# Patient Record
Sex: MALE
[De-identification: names, ages, dates, MRNs, and addresses within clinical notes are randomized per-mention and may not be internally consistent; named-entity substitution may affect disease eponyms.]

---

## 2020-05-08 NOTE — EMERGENCY DEPARTMENT REPORT
ED Psych HPI





- General


Chief Complaint: Psych


Stated Complaint: SI


Time Seen by Provider: 05/08/20 23:16


Source: patient, EMS ( EMS documentation not available at time of chart 

dictation ), RN notes reviewed


Mode of arrival: Ambulatory


Limitations: No Limitations





- History of Present Illness


Initial Comments: 





Patient is a 44-year-old gentleman with a history of psychiatric disease, not 

known to myself previously, presenting to the ER with a complaint of painless 

suicidality.  He indicates he will likely jump into traffic.  He denies physical

pain at this time.  He denies attempting to overdose.  He endorses compliance 

with his psychiatric medications.  He states he does not take lithium or 

valproic acid.





He denies headache, neck pain, chest pain, abdominal pain, shortness of breath, 

and urinary symptoms.  He indicates he cannot think of any exacerbating or 

relieving factors for his current symptoms, nor can he think of any inciting 

factors.  He is staying at home, self isolating in quarantining, and he 

indicates he does not feel like he has been exposed to the coronavirus.  The 

more, he indicates that he does not want to get it because "I do not want to get

sick."


MD Complaint: suicidal ideation, feels depressed


-: Gradual


Associated Psychiatric Symptoms: suicidal ideation


History of same: Yes


Quality: other


Improves With: none


Worsens With: none


Associated Symptoms: denies other symptoms


If Self Harm: admits thoughts of, has plan





- Related Data


                                    Allergies











Allergy/AdvReac Type Severity Reaction Status Date / Time


 


ziprasidone [From Geodon] Allergy  Anaphylaxis Verified 05/01/20 23:41














ED Review of Systems


ROS: 


Stated complaint: SI


Other details as noted in HPI





Comment: All other systems reviewed and negative


Psychiatric: suicidal thoughts.  denies: homicidal thoughts





ED Past Medical Hx





- Past Medical History


Hx Psychiatric Treatment: Yes (Bipolar schizophrenia; Depression)


Additional medical history: Heart murmur





- Surgical History


Past Surgical History?: Yes


Additional Surgical History: Right ankle, pilonidal cysts





- Social History


Smoking Status: Current Some Day Smoker


Substance Use Type: None





ED Physical Exam





- General


Limitations: No Limitations


General appearance: alert, obese





- Head


Head exam: Present: atraumatic, normocephalic





- Eye


Eye exam: Present: normal appearance, EOMI.  Absent: nystagmus





- ENT


ENT exam: Present: normal exam, normal orophraynx, mucous membranes moist, 

normal external ear exam





- Neck


Neck exam: Present: normal inspection, full ROM.  Absent: tenderness, m

eningismus





- Respiratory


Respiratory exam: Present: normal lung sounds bilaterally.  Absent: respiratory 

distress





- Cardiovascular


Cardiovascular Exam: Present: regular rate, normal rhythm, normal heart sounds. 

Absent: bradycardia, tachycardia, irregular rhythm, systolic murmur, diastolic 

murmur, rubs, gallop





- GI/Abdominal


GI/Abdominal exam: Present: soft, normal bowel sounds.  Absent: distended, 

tenderness, guarding, rebound, rigid, pulsatile mass





- Rectal


Rectal exam: Present: deferred





- Extremities Exam


Extremities exam: Present: normal inspection, full ROM, other (2+ pulses noted 

in the bilateral upper and lower extremities.  There is no palpable cord.   

negative Homans sign.  Muscular compartments are soft.  The pelvis is stable.). 

Absent: pedal edema, calf tenderness





- Back Exam


Back exam: Present: normal inspection, full ROM.  Absent: tenderness, CVA 

tenderness (R), CVA tenderness (L), paraspinal tenderness, vertebral tenderness





- Neurological Exam


Neurological exam: Present: alert, oriented X3, normal gait, other (No facial 

droop.  Tongue midline.  Extraocular movements intact bilaterally.  Facial s

ensation intact to light touch in V1, V2, V3 distribution bilaterally.  5 and a 

5 strength in 4 extremities.  Sensation intact to light touch in 4 

extremities.).  Absent: motor sensory deficit





- Psychiatric


Psychiatric exam: Present: flat affect, suicidal ideation





- Skin


Skin exam: Present: warm, dry, intact, normal color.  Absent: rash





ED Course


                                   Vital Signs











  05/08/20 05/08/20 05/09/20





  22:44 22:47 02:08


 


Temperature 98.5 F  98.7 F


 


Pulse Rate 114 H  96 H


 


Respiratory 16 20 18





Rate   


 


Blood Pressure 147/85  


 


Blood Pressure   140/67





[Right]   


 


O2 Sat by Pulse 94 100 95





Oximetry   














  05/09/20 05/09/20





  10:23 13:53


 


Temperature 99.1 F 98.7 F


 


Pulse Rate 92 H 101 H


 


Respiratory 18 18





Rate  


 


Blood Pressure 130/78 136/79


 


Blood Pressure  





[Right]  


 


O2 Sat by Pulse 96 97





Oximetry  














ED Medical Decision Making





- Lab Data


Result diagrams: 


                                 05/08/20 22:55





                                 05/08/20 22:55








                                   Lab Results











  05/08/20 05/08/20 05/08/20 Range/Units





  22:55 22:55 22:55 


 


WBC     (4.5-11.0)  K/mm3


 


RBC     (3.65-5.03)  M/mm3


 


Hgb     (11.8-15.2)  gm/dl


 


Hct     (35.5-45.6)  %


 


MCV     (84-94)  fl


 


MCH     (28-32)  pg


 


MCHC     (32-34)  %


 


RDW     (13.2-15.2)  %


 


Plt Count     (140-440)  K/mm3


 


Lymph % (Auto)     (13.4-35.0)  %


 


Mono % (Auto)     (0.0-7.3)  %


 


Eos % (Auto)     (0.0-4.3)  %


 


Baso % (Auto)     (0.0-1.8)  %


 


Lymph #     (1.2-5.4)  K/mm3


 


Mono #     (0.0-0.8)  K/mm3


 


Eos #     (0.0-0.4)  K/mm3


 


Baso #     (0.0-0.1)  K/mm3


 


Seg Neutrophils %     (40.0-70.0)  %


 


Seg Neutrophils #     (1.8-7.7)  K/mm3


 


Sodium    137  (137-145)  mmol/L


 


Potassium    4.1  (3.6-5.0)  mmol/L


 


Chloride    100.1  ()  mmol/L


 


Carbon Dioxide    22  (22-30)  mmol/L


 


Anion Gap    19  mmol/L


 


BUN    11  (9-20)  mg/dL


 


Creatinine    0.7 L  (0.8-1.5)  mg/dL


 


Estimated GFR    > 60  ml/min


 


BUN/Creatinine Ratio    16  %


 


Glucose    104 H  ()  mg/dL


 


Calcium    9.4  (8.4-10.2)  mg/dL


 


Salicylates  < 0.3 L    (2.8-20.0)  mg/dL


 


Acetaminophen   < 5.0 L   (10.0-30.0)  ug/mL


 


Plasma/Serum Alcohol     (0-0.07)  %














  05/08/20 05/08/20 Range/Units





  22:55 22:55 


 


WBC   8.1  (4.5-11.0)  K/mm3


 


RBC   5.92 H  (3.65-5.03)  M/mm3


 


Hgb   18.1 H  (11.8-15.2)  gm/dl


 


Hct   51.6 H  (35.5-45.6)  %


 


MCV   87  (84-94)  fl


 


MCH   31  (28-32)  pg


 


MCHC   35 H  (32-34)  %


 


RDW   14.1  (13.2-15.2)  %


 


Plt Count   211  (140-440)  K/mm3


 


Lymph % (Auto)   35.2 H  (13.4-35.0)  %


 


Mono % (Auto)   8.2 H  (0.0-7.3)  %


 


Eos % (Auto)   3.1  (0.0-4.3)  %


 


Baso % (Auto)   2.5 H  (0.0-1.8)  %


 


Lymph #   2.9  (1.2-5.4)  K/mm3


 


Mono #   0.7  (0.0-0.8)  K/mm3


 


Eos #   0.3  (0.0-0.4)  K/mm3


 


Baso #   0.2 H  (0.0-0.1)  K/mm3


 


Seg Neutrophils %   51.0  (40.0-70.0)  %


 


Seg Neutrophils #   4.2  (1.8-7.7)  K/mm3


 


Sodium    (137-145)  mmol/L


 


Potassium    (3.6-5.0)  mmol/L


 


Chloride    ()  mmol/L


 


Carbon Dioxide    (22-30)  mmol/L


 


Anion Gap    mmol/L


 


BUN    (9-20)  mg/dL


 


Creatinine    (0.8-1.5)  mg/dL


 


Estimated GFR    ml/min


 


BUN/Creatinine Ratio    %


 


Glucose    ()  mg/dL


 


Calcium    (8.4-10.2)  mg/dL


 


Salicylates    (2.8-20.0)  mg/dL


 


Acetaminophen    (10.0-30.0)  ug/mL


 


Plasma/Serum Alcohol  < 0.01   (0-0.07)  %











                                   Vital Signs











  05/08/20





  22:44


 


Temperature 98.5 F


 


Pulse Rate 114 H


 


Respiratory 16





Rate 


 


Blood Pressure 147/85


 


O2 Sat by Pulse 94





Oximetry 














- Medical Decision Making





Differential diagnosis, including but not limited to: Suicidality, dysthymia, 

depression, medical clearance for psychiatric placement





Assessment and plan: 44-year-old gentleman, who is afebrile, with reassuring 

vital signs, clinically sober, walking with a steady gait, without complaint of 

physical pain, GCS of 15, pleasant and cooperative, with a blunted and flattened

affect, complaining of suicidality with plan to jump into traffic.  He is placed

on hold.  Screening laboratory studies are reviewed and appreciated.  They are 

fairly unremarkable.  Psychiatric consultation is requested.  I will also 

request that nursing team reconcile the patient's home medications.  At this 

point in time, the patient does not appear to have an immediate medical 

contraindication to psychiatric admission, evaluation, consultation and 

placement.


Critical care attestation.: 


If time is entered above; I have spent that time in minutes in the direct care 

of this critically ill patient, excluding procedure time.








ED Disposition


Clinical Impression: 


 Medical clearance for psychiatric admission





Disposition: DC-01 TO HOME OR SELFCARE


Is pt being admited?: No


Does the pt Need Aspirin: No


Condition: Good


Referrals: 


PRIMARY CARE,MD [Primary Care Provider] - 3-5 Days

## 2020-05-18 NOTE — EMERGENCY DEPARTMENT REPORT
ED Abdominal Pain HPI





- General


Chief Complaint: Abdominal Pain


Stated Complaint: N/V


Time Seen by Provider: 05/18/20 09:03


Source: patient, police


Mode of arrival: Ambulatory


Limitations: No Limitations





- History of Present Illness


Initial Comments: 


This is a 44-year old man who has had nausea vomiting and diarrhea for about 4 

days.  He states he has had some epigastric discomfort but only on vomiting.  He

believes he has had a fever but no recent chills.  He denies a history of any 

previous presentation for gastrointestinal illness.  He said no prior surgeries.

 He denies any other symptoms.





MD Complaint: abdominal pain


-: Gradual, days(s)


Location: epigastric


Radiation: none


Migration to: no migration


Severity: mild (Pain was mild to moderate and associated with nausea and 

vomiting), moderate


Quality: cramping


Consistency: now resolved


Improves With: nothing


Worsens With: nothing


Associated Symptoms: nausea, vomiting, diarrhea, fever





- Related Data


                                  Previous Rx's











 Medication  Instructions  Recorded  Last Taken  Type


 


Ondansetron [Zofran Odt] 4 mg PO Q6H PRN #10 tab.rapdis 05/18/20 Unknown Rx


 


metroNIDAZOLE [Flagyl] 500 mg PO Q8HR #20 tablet 05/18/20 Unknown Rx











                                    Allergies











Allergy/AdvReac Type Severity Reaction Status Date / Time


 


ziprasidone [From Geodon] Allergy  Anaphylaxis Verified 05/01/20 23:41














ED Review of Systems


ROS: 


Stated complaint: N/V


Other details as noted in HPI





Constitutional: denies: chills, fever


Eyes: denies: eye pain, eye discharge, vision change


ENT: denies: ear pain, throat pain


Respiratory: denies: cough, shortness of breath, wheezing


Cardiovascular: denies: chest pain, palpitations


Endocrine: no symptoms reported


Gastrointestinal: abdominal pain, nausea, vomiting, diarrhea


Genitourinary: denies: urgency, dysuria


Musculoskeletal: denies: back pain, joint swelling, arthralgia


Skin: denies: rash, lesions


Neurological: denies: headache, weakness, paresthesias


Psychiatric: denies: anxiety, depression


Hematological/Lymphatic: denies: easy bleeding, easy bruising





ED Past Medical Hx





- Past Medical History


Previous Medical History?: Yes


Hx Psychiatric Treatment: Yes (Bipolar schizophrenia; Depression)


Additional medical history: Heart murmur





- Surgical History


Past Surgical History?: Yes


Additional Surgical History: Right ankle, pilonidal cysts





- Social History


Smoking Status: Current Every Day Smoker





- Medications


Home Medications: 


                                Home Medications











 Medication  Instructions  Recorded  Confirmed  Last Taken  Type


 


Ondansetron [Zofran Odt] 4 mg PO Q6H PRN #10 tab.rapdis 05/18/20  Unknown Rx


 


metroNIDAZOLE [Flagyl] 500 mg PO Q8HR #20 tablet 05/18/20  Unknown Rx














ED Physical Exam





- General


Limitations: No Limitations


General appearance: alert, in no apparent distress





- Head


Head exam: Present: atraumatic, normocephalic





- Eye


Eye exam: Present: normal appearance.  Absent: scleral icterus





- ENT


ENT exam: Present: mucous membranes moist





- Neck


Neck exam: Present: normal inspection.  Absent: tenderness, meningismus





- Respiratory


Respiratory exam: Present: normal lung sounds bilaterally.  Absent: respiratory 

distress





- Cardiovascular


Cardiovascular Exam: Present: normal rhythm, tachycardia.  Absent: systolic 

murmur, diastolic murmur, rubs, gallop





- GI/Abdominal


GI/Abdominal exam: Present: soft, normal bowel sounds.  Absent: distended, te

nderness, guarding, rebound, rigid





- Rectal


Rectal exam: Present: deferred





- Extremities Exam


Extremities exam: Present: normal inspection





- Back Exam


Back exam: Present: normal inspection





- Neurological Exam


Neurological exam: Present: alert, oriented X3, CN II-XII intact.  Absent: motor

sensory deficit





- Psychiatric


Psychiatric exam: Present: normal affect, normal mood





- Skin


Skin exam: Present: warm, dry, intact, normal color.  Absent: rash





ED Course


                                   Vital Signs











  05/18/20 05/18/20 05/18/20





  06:08 11:54 12:01


 


Temperature 100.4 F H  


 


Pulse Rate 125 H  112 H


 


Respiratory 18 22 24





Rate   


 


Blood Pressure 118/80  128/74


 


O2 Sat by Pulse 95  96





Oximetry   














- Reevaluation(s)


Reevaluation #1: 


Patient received 1 dose of Zosyn empirically.  A CT showed no evidence of intra-

abdominal inflammation.  2 lactic acid levels were normal.  I am going to 

empirically place him on Flagyl give him Zosyn and appropriate return 

instructions.  He is appropriate for outpatient disposition.


05/18/20 13:21








ED Medical Decision Making





- Lab Data


Result diagrams: 


                                 05/18/20 06:42





                                 05/18/20 06:42








                         Laboratory Results - last 24 hr











  05/18/20 05/18/20 05/18/20





  06:42 06:42 07:03


 


WBC  15.3 H  


 


RBC  6.27 H  


 


Hgb  19.0 H  


 


Hct  54.7 H  


 


MCV  87  


 


MCH  30  


 


MCHC  35 H  


 


RDW  14.1  


 


Plt Count  208  


 


Add Manual Diff  Complete  


 


Total Counted  100  


 


Seg Neutrophils %  Np  


 


Seg Neuts % (Manual)  86.0 H  


 


Band Neutrophils %  1.0  


 


Lymphocytes % (Manual)  5.0 L  


 


Reactive Lymphs % (Man)  0  


 


Monocytes % (Manual)  6.0  


 


Eosinophils % (Manual)  2.0  


 


Basophils % (Manual)  0  


 


Metamyelocytes %  0  


 


Myelocytes %  0  


 


Promyelocytes %  0  


 


Blast Cells %  0  


 


Nucleated RBC %  Not Reportable  


 


Seg Neutrophils # Man  13.2 H  


 


Band Neutrophils #  0.2  


 


Lymphocytes # (Manual)  0.8 L  


 


Abs React Lymphs (Man)  0.0  


 


Monocytes # (Manual)  0.9 H  


 


Eosinophils # (Manual)  0.3  


 


Basophils # (Manual)  0.0  


 


Metamyelocytes #  0.0  


 


Myelocytes #  0.0  


 


Promyelocytes #  0.0  


 


Blast Cells #  0.0  


 


WBC Morphology  Not Reportable  


 


Hypersegmented Neuts  Not Reportable  


 


Hyposegmented Neuts  Not Reportable  


 


Hypogranular Neuts  Not Reportable  


 


Smudge Cells  Not Reportable  


 


Toxic Granulation  Not Reportable  


 


Toxic Vacuolation  Not Reportable  


 


Dohle Bodies  Not Reportable  


 


Pelger-Huet Anomaly  Not Reportable  


 


Virgen Rods  Not Reportable  


 


Platelet Estimate  Consistent w auto  


 


Clumped Platelets  Not Reportable  


 


Plt Clumps, EDTA  Not Reportable  


 


Large Platelets  Few  


 


Giant Platelets  Not Reportable  


 


Platelet Satelliting  Not Reportable  


 


Plt Morphology Comment  Not Reportable  


 


RBC Morphology  Not Reportable  


 


Dimorphic RBCs  Not Reportable  


 


Polychromasia  Not Reportable  


 


Hypochromasia  Not Reportable  


 


Poikilocytosis  Not Reportable  


 


Anisocytosis  1+  


 


Microcytosis  Not Reportable  


 


Macrocytosis  Not Reportable  


 


Spherocytes  Not Reportable  


 


Pappenheimer Bodies  Not Reportable  


 


Sickle Cells  Not Reportable  


 


Target Cells  Not Reportable  


 


Tear Drop Cells  Not Reportable  


 


Ovalocytes  Not Reportable  


 


Stomatocytes  1+  


 


Helmet Cells  Not Reportable  


 


[place] Bodies  Not Reportable  


 


Cabot Rings  Not Reportable  


 


[place] Cells  Not Reportable  


 


Bite Cells  Not Reportable  


 


Crenated Cell  Not Reportable  


 


Elliptocytes  Not Reportable  


 


Acanthocytes (Spur)  Not Reportable  


 


Rouleaux  Not Reportable  


 


Hemoglobin C Crystals  Not Reportable  


 


Schistocytes  Not Reportable  


 


Malaria parasites  Not Reportable  


 


[place] Bodies  Not Reportable  


 


Hem Pathologist Commnt  No  


 


PT    12.4


 


INR    0.91


 


VBG pH   


 


Sodium   138 


 


Potassium   4.4 


 


Chloride   99.3 


 


Carbon Dioxide   22 


 


Anion Gap   21 


 


BUN   20 


 


Creatinine   0.8 


 


Estimated GFR   > 60 


 


BUN/Creatinine Ratio   25 


 


Glucose   121 H 


 


Lactic Acid   


 


Calcium   9.0 


 


Total Bilirubin   0.60 


 


AST   25 


 


ALT   37 


 


Alkaline Phosphatase   72 


 


Total Protein   6.9 


 


Albumin   4.8 


 


Albumin/Globulin Ratio   2.3 


 


Lipase   18 


 


Urine Color   


 


Urine Turbidity   


 


Urine pH   


 


Ur Specific Gravity   


 


Urine Protein   


 


Urine Glucose (UA)   


 


Urine Ketones   


 


Urine Blood   


 


Urine Nitrite   


 


Urine Bilirubin   


 


Urine Urobilinogen   


 


Ur Leukocyte Esterase   


 


Urine WBC (Auto)   


 


Urine RBC (Auto)   


 


Urine Mucus   














  05/18/20 05/18/20 05/18/20





  07:05 07:07 07:07


 


WBC   


 


RBC   


 


Hgb   


 


Hct   


 


MCV   


 


MCH   


 


MCHC   


 


RDW   


 


Plt Count   


 


Add Manual Diff   


 


Total Counted   


 


Seg Neutrophils %   


 


Seg Neuts % (Manual)   


 


Band Neutrophils %   


 


Lymphocytes % (Manual)   


 


Reactive Lymphs % (Man)   


 


Monocytes % (Manual)   


 


Eosinophils % (Manual)   


 


Basophils % (Manual)   


 


Metamyelocytes %   


 


Myelocytes %   


 


Promyelocytes %   


 


Blast Cells %   


 


Nucleated RBC %   


 


Seg Neutrophils # Man   


 


Band Neutrophils #   


 


Lymphocytes # (Manual)   


 


Abs React Lymphs (Man)   


 


Monocytes # (Manual)   


 


Eosinophils # (Manual)   


 


Basophils # (Manual)   


 


Metamyelocytes #   


 


Myelocytes #   


 


Promyelocytes #   


 


Blast Cells #   


 


WBC Morphology   


 


Hypersegmented Neuts   


 


Hyposegmented Neuts   


 


Hypogranular Neuts   


 


Smudge Cells   


 


Toxic Granulation   


 


Toxic Vacuolation   


 


Dohle Bodies   


 


Pelger-Huet Anomaly   


 


Virgen Rods   


 


Platelet Estimate   


 


Clumped Platelets   


 


Plt Clumps, EDTA   


 


Large Platelets   


 


Giant Platelets   


 


Platelet Satelliting   


 


Plt Morphology Comment   


 


RBC Morphology   


 


Dimorphic RBCs   


 


Polychromasia   


 


Hypochromasia   


 


Poikilocytosis   


 


Anisocytosis   


 


Microcytosis   


 


Macrocytosis   


 


Spherocytes   


 


Pappenheimer Bodies   


 


Sickle Cells   


 


Target Cells   


 


Tear Drop Cells   


 


Ovalocytes   


 


Stomatocytes   


 


Helmet Cells   


 


[place] Bodies   


 


Cabot Rings   


 


Bridget Cells   


 


Bite Cells   


 


Crenated Cell   


 


Elliptocytes   


 


Acanthocytes (Spur)   


 


Rouleaux   


 


Hemoglobin C Crystals   


 


Schistocytes   


 


Malaria parasites   


 


[place] Bodies   


 


Hem Pathologist Commnt   


 


PT   


 


INR   


 


VBG pH    7.429 H


 


Sodium   


 


Potassium   


 


Chloride   


 


Carbon Dioxide   


 


Anion Gap   


 


BUN   


 


Creatinine   


 


Estimated GFR   


 


BUN/Creatinine Ratio   


 


Glucose   


 


Lactic Acid   2.10 H* 


 


Calcium   


 


Total Bilirubin   


 


AST   


 


ALT   


 


Alkaline Phosphatase   


 


Total Protein   


 


Albumin   


 


Albumin/Globulin Ratio   


 


Lipase   


 


Urine Color  Yellow  


 


Urine Turbidity  Clear  


 


Urine pH  5.0  


 


Ur Specific Gravity  1.028  


 


Urine Protein  <15 mg/dl  


 


Urine Glucose (UA)  Neg  


 


Urine Ketones  Neg  


 


Urine Blood  Neg  


 


Urine Nitrite  Neg  


 


Urine Bilirubin  Neg  


 


Urine Urobilinogen  < 2.0  


 


Ur Leukocyte Esterase  Neg  


 


Urine WBC (Auto)  1.0  


 


Urine RBC (Auto)  3.0  


 


Urine Mucus  2+  














  05/18/20 05/18/20





  09:13 10:33


 


WBC  


 


RBC  


 


Hgb  


 


Hct  


 


MCV  


 


MCH  


 


MCHC  


 


RDW  


 


Plt Count  


 


Add Manual Diff  


 


Total Counted  


 


Seg Neutrophils %  


 


Seg Neuts % (Manual)  


 


Band Neutrophils %  


 


Lymphocytes % (Manual)  


 


Reactive Lymphs % (Man)  


 


Monocytes % (Manual)  


 


Eosinophils % (Manual)  


 


Basophils % (Manual)  


 


Metamyelocytes %  


 


Myelocytes %  


 


Promyelocytes %  


 


Blast Cells %  


 


Nucleated RBC %  


 


Seg Neutrophils # Man  


 


Band Neutrophils #  


 


Lymphocytes # (Manual)  


 


Abs React Lymphs (Man)  


 


Monocytes # (Manual)  


 


Eosinophils # (Manual)  


 


Basophils # (Manual)  


 


Metamyelocytes #  


 


Myelocytes #  


 


Promyelocytes #  


 


Blast Cells #  


 


WBC Morphology  


 


Hypersegmented Neuts  


 


Hyposegmented Neuts  


 


Hypogranular Neuts  


 


Smudge Cells  


 


Toxic Granulation  


 


Toxic Vacuolation  


 


Dohle Bodies  


 


Pelger-Huet Anomaly  


 


Virgen Rods  


 


Platelet Estimate  


 


Clumped Platelets  


 


Plt Clumps, EDTA  


 


Large Platelets  


 


Giant Platelets  


 


Platelet Satelliting  


 


Plt Morphology Comment  


 


RBC Morphology  


 


Dimorphic RBCs  


 


Polychromasia  


 


Hypochromasia  


 


Poikilocytosis  


 


Anisocytosis  


 


Microcytosis  


 


Macrocytosis  


 


Spherocytes  


 


Pappenheimer Bodies  


 


Sickle Cells  


 


Target Cells  


 


Tear Drop Cells  


 


Ovalocytes  


 


Stomatocytes  


 


Helmet Cells  


 


[place] Bodies  


 


Cabot Rings  


 


[place] Cells  


 


Bite Cells  


 


Crenated Cell  


 


Elliptocytes  


 


Acanthocytes (Spur)  


 


Rouleaux  


 


Hemoglobin C Crystals  


 


Schistocytes  


 


Malaria parasites  


 


[place] Bodies  


 


Hem Pathologist Commnt  


 


PT  


 


INR  


 


VBG pH  


 


Sodium  


 


Potassium  


 


Chloride  


 


Carbon Dioxide  


 


Anion Gap  


 


BUN  


 


Creatinine  


 


Estimated GFR  


 


BUN/Creatinine Ratio  


 


Glucose  


 


Lactic Acid  1.60  1.30


 


Calcium  


 


Total Bilirubin  


 


AST  


 


ALT  


 


Alkaline Phosphatase  


 


Total Protein  


 


Albumin  


 


Albumin/Globulin Ratio  


 


Lipase  


 


Urine Color  


 


Urine Turbidity  


 


Urine pH  


 


Ur Specific Gravity  


 


Urine Protein  


 


Urine Glucose (UA)  


 


Urine Ketones  


 


Urine Blood  


 


Urine Nitrite  


 


Urine Bilirubin  


 


Urine Urobilinogen  


 


Ur Leukocyte Esterase  


 


Urine WBC (Auto)  


 


Urine RBC (Auto)  


 


Urine Mucus  














- EKG Data


EKG shows normal: sinus rhythm, axis, intervals, QRS complexes, ST-T waves


Rate: tachycardia





- EKG Data


Interpretation: no acute changes, nonspecific ST-T wave chilo





- Radiology Data


Radiology results: report reviewed





IMPRESSION: 


1. Negative for obstruction or acute appearing inflammation. 


2. Increased density at the root of the small bowel mesentery with small 

associated nodes is likely


 reactive. 





Critical care attestation.: 


If time is entered above; I have spent that time in minutes in the direct care 

of this critically ill patient, excluding procedure time.








ED Disposition


Clinical Impression: 


 Gastroenteritis, Dehydration





Disposition: DC-01 TO HOME OR SELFCARE


Is pt being admited?: No


Does the pt Need Aspirin: No


Condition: Stable


Instructions:  Gastroenteritis (ED)


Additional Instructions: 


Tylenol as needed for fever.  Return any significant abdominal pain.  Return any

acute change or problem.  Follow-up with your primary care provider at [facility].  Rx as directed.  It is essential that you do not drink any 

alcohol while you are on the prescribed medications.


Prescriptions: 


metroNIDAZOLE [Flagyl] 500 mg PO Q8HR #20 tablet


Ondansetron [Zofran Odt] 4 mg PO Q6H PRN #10 tab.rapdis


 PRN Reason: Nausea


Referrals: 


PRIMARY CARE,MD [Primary Care Provider] - 3-5 Days


[facility] [Provider Group] - 3-5 Days


Time of Disposition: 13:22

## 2020-05-18 NOTE — CAT SCAN REPORT
CT abdomen pelvis w con



INDICATION:  abd pain fever x 3 days .



TECHNIQUE:

All CT scans at this location are performed using the following dose modulation technique: Automated 
exposure control.



CONTRAST:  Omnipaque 300, 100 cc IV injection.



COMPARISON: None available.



CT ABDOMEN: The parenchymal organs are unremarkable in appearance. Negative for abdominal mass, fluid
 or inflammation. The bowel is not dilated or thickened.



Mild increased density at the root of the small bowel mesentery with small associated nodes is likely
 reactive.



CT PELVIS: Negative for mass, fluid collection or inflammation. The appendix is normal.



IMPRESSION:

1. Negative for obstruction or acute appearing inflammation.

2. Increased density at the root of the small bowel mesentery with small associated nodes is likely r
eactive. 



Signer Name: Jah Parada MD 

Signed: 5/18/2020 10:57 AM

Workstation Name: [email or web address]

## 2020-05-18 NOTE — XRAY REPORT
CHEST 1 VIEW 



INDICATION / CLINICAL INFORMATION:

possible Sepsis. Cough



COMPARISON: 

5/1/2020



FINDINGS:



SUPPORT DEVICES: None.



HEART / MEDIASTINUM: No significant abnormality. 



LUNGS / PLEURA: Ill-defined density within the left lower lung has worsened from 5/1/2020



Signer Name: Wander Mann MD 

Signed: 5/18/2020 8:54 AM

Workstation Name: [email or web address]

## 2020-06-24 NOTE — EMERGENCY DEPARTMENT REPORT
ED Psych HPI





- General


Chief Complaint: Psych


Stated Complaint: PSYCH EVALUATION


Time Seen by Provider: 06/24/20 06:13


Source: patient


Mode of arrival: Ambulatory





- History of Present Illness


Initial Comments: 


This is a 44-year-old male with recent frequent visits to the emergency 

department.  He was seen early in May but did not require placement in an acute 

psychiatric facility.  Since then he has not followed up with a mental health 

provider.  He states he lives in [place].  States he took a bus here and 

finally walked to the hospital.  Is unclear why he would be utilizing this 

hospital.  He states he was previously on trazodone 150 mg at night and Vistaril

in the morning.  He was on another medication for hearing voices.  He states the

voices sometimes tell him to "help people".  He has no command loose Nations 

that would urge him to do violence to himself or others.  He is not currently 

hallucinating.  He has no intent to hurt himself.  When questioned further he 

mentions such things as his chronic problems such as sleep apnea that he needs 

continued care with.  He states he is basically here to be placed back on his 

medicine.





MD Complaint: other


-: year(s)


Associated Psychiatric Symptoms: auditory hallucinations


History of same: Yes


Quality: intermittent


Improves With: medication


Worsens With: none


Context: not taking psychiatric


Associated Symptoms: denies other symptoms


Treatments Prior to Arrival: none





- Related Data


                                  Previous Rx's











 Medication  Instructions  Recorded  Last Taken  Type


 


Ondansetron [Zofran Odt] 4 mg PO Q6H PRN #10 tab.rapdis 05/18/20 Unknown Rx


 


metroNIDAZOLE [Flagyl] 500 mg PO Q8HR #20 tablet 05/18/20 Unknown Rx


 


OLANzapine [ZyPREXA] 5 mg PO QDAY #30 tablet 06/24/20 Unknown Rx


 


traZODone [Desyrel] 100 mg PO QHS #14 tablet 06/24/20 Unknown Rx











                                    Allergies











Allergy/AdvReac Type Severity Reaction Status Date / Time


 


ziprasidone [From Geodon] Allergy  Anaphylaxis Verified 05/01/20 23:41














ED Review of Systems


ROS: 


Stated complaint: PSYCH EVALUATION


Other details as noted in HPI





Constitutional: denies: chills, fever


Eyes: denies: eye pain, eye discharge, vision change


ENT: denies: ear pain, throat pain


Respiratory: other (States he has sleep apnea).  denies: cough, shortness of 

breath, wheezing


Cardiovascular: denies: chest pain, palpitations


Endocrine: no symptoms reported


Gastrointestinal: denies: abdominal pain, nausea, diarrhea


Genitourinary: denies: urgency, dysuria


Musculoskeletal: denies: back pain, joint swelling, arthralgia


Skin: denies: rash, lesions


Neurological: as per HPI.  denies: headache, weakness, paresthesias


Psychiatric: denies: anxiety, depression


Hematological/Lymphatic: denies: easy bleeding, easy bruising





ED Past Medical Hx





- Past Medical History


Previous Medical History?: Yes


Hx GERD: Yes


Hx Psychiatric Treatment: Yes (Bipolar schizophrenia; Depression)


Additional medical history: Heart murmur





- Surgical History


Past Surgical History?: Yes


Additional Surgical History: Right ankle, pilonidal cysts





- Social History


Smoking Status: Current Every Day Smoker


Substance Use Type: None


Other Social History: 


States he is not homeless and lives in [place].








- Medications


Home Medications: 


                                Home Medications











 Medication  Instructions  Recorded  Confirmed  Last Taken  Type


 


Ondansetron [Zofran Odt] 4 mg PO Q6H PRN #10 tab.rapdis 05/18/20  Unknown Rx


 


metroNIDAZOLE [Flagyl] 500 mg PO Q8HR #20 tablet 05/18/20  Unknown Rx


 


OLANzapine [ZyPREXA] 5 mg PO QDAY #30 tablet 06/24/20  Unknown Rx


 


traZODone [Desyrel] 100 mg PO QHS #14 tablet 06/24/20  Unknown Rx














ED Physical Exam





- General


Limitations: No Limitations


General appearance: alert, in no apparent distress





- Head


Head exam: Present: atraumatic, normocephalic





- Eye


Eye exam: Present: normal appearance.  Absent: scleral icterus





- ENT


ENT exam: Present: mucous membranes moist





- Neck


Neck exam: Present: normal inspection.  Absent: meningismus





- Respiratory


Respiratory exam: Present: normal lung sounds bilaterally.  Absent: respiratory 

distress





- Cardiovascular


Cardiovascular Exam: Present: regular rate, normal rhythm.  Absent: systolic 

murmur, diastolic murmur, rubs, gallop





- GI/Abdominal


GI/Abdominal exam: Present: soft, normal bowel sounds.  Absent: distended, 

tenderness





- Rectal


Rectal exam: Present: deferred





- Extremities Exam


Extremities exam: Present: normal inspection





- Back Exam


Back exam: Present: normal inspection





- Neurological Exam


Neurological exam: Present: alert, oriented X3, CN II-XII intact.  Absent: motor

 sensory deficit





- Psychiatric


Psychiatric exam: Present: normal mood, flat affect, other (Sometimes mild loose

 associations)





- Skin


Skin exam: Present: warm, dry, intact, normal color.  Absent: rash





ED Course


                                   Vital Signs











  06/24/20





  03:33


 


Temperature 99.1 F


 


Pulse Rate 107 H


 


Respiratory 20





Rate 


 


Blood Pressure 135/89


 


O2 Sat by Pulse 95





Oximetry 














ED Medical Decision Making





- Lab Data


Result diagrams: 


                                 06/24/20 04:58





                                 06/24/20 04:58








                         Laboratory Results - last 24 hr











  06/24/20 06/24/20 06/24/20





  04:58 04:58 04:58


 


WBC   


 


RBC   


 


Hgb   


 


Hct   


 


MCV   


 


MCH   


 


MCHC   


 


RDW   


 


Plt Count   


 


Lymph % (Auto)   


 


Mono % (Auto)   


 


Eos % (Auto)   


 


Baso % (Auto)   


 


Lymph #   


 


Mono #   


 


Eos #   


 


Baso #   


 


Seg Neutrophils %   


 


Seg Neutrophils #   


 


Sodium    140


 


Potassium    4.0


 


Chloride    101.3


 


Carbon Dioxide    24


 


Anion Gap    19


 


BUN    16


 


Creatinine    0.8


 


Estimated GFR    > 60


 


BUN/Creatinine Ratio    20


 


Glucose    85


 


Calcium    9.5


 


Salicylates  < 0.3 L  


 


Acetaminophen   < 5.0 L 


 


Plasma/Serum Alcohol   














  06/24/20 06/24/20





  04:58 04:58


 


WBC   11.4 H


 


RBC   6.11 H


 


Hgb   18.6 H


 


Hct   54.3 H


 


MCV   89


 


MCH   31


 


MCHC   34


 


RDW   14.0


 


Plt Count   252


 


Lymph % (Auto)   27.4


 


Mono % (Auto)   7.9 H


 


Eos % (Auto)   2.3


 


Baso % (Auto)   2.1 H


 


Lymph #   3.1


 


Mono #   0.9 H


 


Eos #   0.3


 


Baso #   0.2 H


 


Seg Neutrophils %   60.3


 


Seg Neutrophils #   6.9


 


Sodium  


 


Potassium  


 


Chloride  


 


Carbon Dioxide  


 


Anion Gap  


 


BUN  


 


Creatinine  


 


Estimated GFR  


 


BUN/Creatinine Ratio  


 


Glucose  


 


Calcium  


 


Salicylates  


 


Acetaminophen  


 


Plasma/Serum Alcohol  < 0.01 











Critical care attestation.: 


If time is entered above; I have spent that time in minutes in the direct care 

of this critically ill patient, excluding procedure time.








ED Disposition


Clinical Impression: 


Schizophrenia


Qualifiers:


 Schizophrenia type: unspecified Qualified Code(s): F20.9 - Schizophrenia, unsp

ecified





Disposition: DC-01 TO HOME OR SELFCARE


Is pt being admited?: No


Does the pt Need Aspirin: No


Condition: Stable


Instructions:  Suicide Prevention for Adults (ED), Schizophrenia (ED), 

Dehydration (ED)


Additional Instructions: 


You are a bit dehydrated.  Increase fluids.





There may be a much closer source for mental health care to [place].  Call 

services in your area.  I am going to place you back on your trazodone and give 

you another medicine that may help (Zyprexa).  Emergency department services as 

needed.


Prescriptions: 


traZODone [Desyrel] 100 mg PO QHS #14 tablet


OLANzapine [ZyPREXA] 5 mg PO QDAY #30 tablet


Referrals: 


PRIMARY CARE,MD [Primary Care Provider] - 3-5 Days


[facility] [Outside] - 3-5 Days


Mental health services, your area [Other] - 2-3 Days


Time of Disposition: 06:44

## 2020-07-22 NOTE — EMERGENCY DEPARTMENT REPORT
ED Psych HPI





- General


Chief Complaint: Psych


Stated Complaint: PSYCH


Time Seen by Provider: 07/22/20 01:45


Source: patient, EMS


Mode of arrival: Ambulatory





- History of Present Illness


Initial Comments: 





Mr Colon is a 44 years old pleasant male with history of schizophrenia.  MALINA arenas presented to the emergency room for mental health eval.  Patient stated 

that he has been prescribed Seroquel 100 mg and that makes him sleepy all the 

time and he wanted to see if he can change the medication to another medication.

 Patient admitted that he is hearing voices sometimes but not more frequent.  

Patient denied any suicidal or homicidal ideation.  Patient denied visual 

hallucination also.


MD Complaint: other





- Related Data


                                Home Medications











 Medication  Instructions  Recorded  Confirmed  Last Taken


 


Omeprazole Magnesium [PriLOSEC Otc] 25 mg PO QDAY 07/22/20 07/22/20 Unknown


 


Quetiapine Fumarate [SEROquel] 400 mg PO QHS 07/22/20 07/22/20 Unknown











                                    Allergies











Allergy/AdvReac Type Severity Reaction Status Date / Time


 


ziprasidone [From Geodon] Allergy  Anaphylaxis Verified 05/01/20 23:41














ED Review of Systems


ROS: 


Stated complaint: PSYCH


Other details as noted in HPI





Comment: All other systems reviewed and negative


Constitutional: denies: chills, fever


Respiratory: denies: cough, shortness of breath, SOB with exertion, SOB at rest,

wheezing


Cardiovascular: denies: chest pain, palpitations


Gastrointestinal: denies: abdominal pain, nausea, vomiting


Neurological: denies: headache, weakness, numbness, paresthesias


Psychiatric: auditory hallucinations.  denies: anxiety, depression, visual 

hallucinations, homicidal thoughts, suicidal thoughts





ED Past Medical Hx





- Past Medical History


Previous Medical History?: Yes


Hx GERD: Yes


Hx Psychiatric Treatment: Yes (Bipolar schizophrenia; Depression)


Additional medical history: Heart murmur





- Surgical History


Past Surgical History?: Yes


Additional Surgical History: Right ankle, pilonidal cysts





- Social History


Smoking Status: Current Every Day Smoker


Substance Use Type: None





- Medications


Home Medications: 


                                Home Medications











 Medication  Instructions  Recorded  Confirmed  Last Taken  Type


 


Omeprazole Magnesium [PriLOSEC Otc] 25 mg PO QDAY 07/22/20 07/22/20 Unknown 

History


 


Quetiapine Fumarate [SEROquel] 400 mg PO QHS 07/22/20 07/22/20 Unknown History














ED Physical Exam





- General


Limitations: No Limitations


General appearance: alert, in no apparent distress





- Head


Head exam: Present: atraumatic, normocephalic, normal inspection





- Eye


Eye exam: Present: normal appearance





- ENT


ENT exam: Present: normal exam, normal orophraynx, mucous membranes moist





- Neck


Neck exam: Present: normal inspection, full ROM.  Absent: tenderness, 

meningismus, lymphadenopathy, thyromegaly





- Respiratory


Respiratory exam: Present: normal lung sounds bilaterally





- Cardiovascular


Cardiovascular Exam: Present: regular rate, normal rhythm, normal heart sounds





- GI/Abdominal


GI/Abdominal exam: Present: soft, normal bowel sounds.  Absent: distended, ten

derness, guarding, rebound, rigid, organomegaly, mass, bruit, pulsatile mass, 

hernia





- Extremities Exam


Extremities exam: Present: normal inspection, full ROM, normal capillary refill.

 Absent: pedal edema, calf tenderness





- Back Exam


Back exam: Present: normal inspection, full ROM.  Absent: CVA tenderness (R), 

CVA tenderness (L)





- Neurological Exam


Neurological exam: Present: alert, oriented X3, CN II-XII intact, normal gait, 

reflexes normal.  Absent: abnormal gait, motor sensory deficit





- Psychiatric


Psychiatric exam: Present: normal mood.  Absent: agitated, anxious, flat affect,

manic, homicidal ideation, suicidal ideation





- Skin


Skin exam: Present: warm, intact, normal color





ED Course


                                   Vital Signs











  07/21/20





  23:14


 


Temperature 98.3 F


 


Pulse Rate 91 H


 


Respiratory 18





Rate 


 


Blood Pressure 127/82


 


O2 Sat by Pulse 93





Oximetry 














ED Medical Decision Making





- Lab Data


Result diagrams: 


                                 07/21/20 23:38





                                 07/21/20 23:38





- Medical Decision Making





Mr Colon is a 44 years old pleasant male with history of schizophrenia.  

Patient presented to the emergency room for mental health eval.  Patient stated 

that he has been prescribed Seroquel 100 mg and that makes him sleepy all the 

time and he wanted to see if he can change the medication to another medication.

 Patient admitted that he is hearing voices sometimes but not more frequent.  

Patient denied any suicidal or homicidal ideation.  Patient denied visual 

hallucination also.





Labs reviewed and is unremarkable.  Patient stated that he has an appointment 

with his psychiatric today and he does not want to miss that appointment.  

Patient is requesting if he can be discharged.  Patient is alert, oriented x3 no

acute distress.  Patient does not meet any criteria for involuntary hold.  Patie

nt is medically and psychiatrically stable for discharge.  I advised patient to 

keep his appointment with his psychiatrics and to return to the ER if he develop

any suicidal or homicidal ideation.


Critical care attestation.: 


If time is entered above; I have spent that time in minutes in the direct care 

of this critically ill patient, excluding procedure time.








ED Disposition


Clinical Impression: 


 Schizophrenia





Disposition: DC-01 TO HOME OR SELFCARE


Is pt being admited?: No


Condition: Stable


Instructions:  Schizophrenia (ED), Suicide Prevention for Adults (ED)


Referrals: 


PRIMARY CARE,MD [Primary Care Provider] - 3-5 Days

## 2020-08-11 NOTE — EMERGENCY DEPARTMENT REPORT
ED Medical Clearance HPI





- General


Chief complaint: Medical Clearance


Stated complaint: MEDICATION EVALUATED


Source: patient


Mode of arrival: Ambulatory





- History of Present Illness


Initial comments: 





Patient is a 44-year-old white male with a history of bipolar disorder, 

schizophrenia and depression as well as GERD who presents to the ED for medical 

clearance in order to be admitted to psychiatric facility in order to straighten

up his mental health medications.  Patient states that he has been taking 

medications Seroquel 400 mg every night but that this medicine makes him sleep 

throughout the day as well.  Patient states that he would like medical clearance

so that he can get admission to any of the psychiatric facilities for further 

evaluation.  Patient denies chest pain, shortness of breath, dizziness, syncope,

cough, abdominal pain, nausea, vomiting, diarrhea, change in vision, headache, 

suicidal or homicidal ideations, hallucinations or fall and traumatic injury.


MD Complaint: medical clearance request


-: Sudden, week(s)


Reason for Medical Clearance: psychiatric condition (chronic bipolar d/o, 

schizophrenia and depression)


Place: home


Alledged Intoxication: No


Compliant with Home Medications: Yes


Traumatic Symptoms: denies traumatic injury


Associated Symptoms: denies: chest pain, shortness of breath, palpitations, 

diaphoresis, denies other symptoms, confusion, cough, fever/chills, headaches, 

anorexia, malaise, nausea/vomiting, rash, seizure, syncope, weakness, other


Treatments Prior to Arrival: none


Home medications: 


                                Home Medications











 Medication  Instructions  Recorded  Confirmed  Last Taken


 


Omeprazole Magnesium [PriLOSEC Otc] 25 mg PO QDAY 07/22/20 07/22/20 Unknown


 


Quetiapine Fumarate [SEROquel] 400 mg PO QHS 07/22/20 07/22/20 Unknown











Allergies/Adverse reactions: 


                                    Allergies











Allergy/AdvReac Type Severity Reaction Status Date / Time


 


ziprasidone [From Geodon] Allergy  Anaphylaxis Verified 05/01/20 23:41














ED Review of Systems


ROS: 


Stated complaint: MEDICATION EVALUATED


Other details as noted in HPI





Constitutional: denies: chills, fever


Eyes: denies: eye pain, eye discharge, vision change


ENT: denies: ear pain, throat pain


Respiratory: denies: cough, shortness of breath, wheezing


Cardiovascular: denies: chest pain, palpitations


Endocrine: no symptoms reported


Gastrointestinal: denies: abdominal pain, nausea, diarrhea


Genitourinary: denies: urgency, dysuria


Musculoskeletal: denies: back pain, joint swelling, arthralgia


Skin: denies: rash, lesions


Neurological: denies: headache, weakness, paresthesias


Psychiatric: anxiety.  denies: depression, auditory hallucinations, visual 

hallucinations, homicidal thoughts, suicidal thoughts


Hematological/Lymphatic: denies: easy bleeding, easy bruising





ED Past Medical Hx





- Past Medical History


Hx GERD: Yes


Hx Psychiatric Treatment: Yes (Bipolar schizophrenia; Depression)


Additional medical history: Heart murmur





- Surgical History


Additional Surgical History: Right ankle, pilonidal cysts





- Social History


Smoking Status: Current Every Day Smoker





- Medications


Home Medications: 


                                Home Medications











 Medication  Instructions  Recorded  Confirmed  Last Taken  Type


 


Omeprazole Magnesium [PriLOSEC Otc] 25 mg PO QDAY 07/22/20 07/22/20 Unknown 

History


 


Quetiapine Fumarate [SEROquel] 400 mg PO QHS 07/22/20 07/22/20 Unknown History














ED Physical Exam





- General


Limitations: No Limitations


General appearance: alert, in no apparent distress





- Head


Head exam: Present: atraumatic, normocephalic, normal inspection





- Eye


Eye exam: Present: normal appearance, PERRL, EOMI


Pupils: Present: normal accommodation





- ENT


ENT exam: Present: normal exam, normal orophraynx, mucous membranes moist, TM's 

normal bilaterally, normal external ear exam





- Neck


Neck exam: Present: normal inspection, full ROM.  Absent: tenderness





- Respiratory


Respiratory exam: Present: normal lung sounds bilaterally.  Absent: respiratory 

distress, wheezes, rhonchi, stridor, chest wall tenderness, accessory muscle 

use, prolonged expiratory





- Cardiovascular


Cardiovascular Exam: Present: regular rate, normal rhythm, normal heart sounds. 

 Absent: systolic murmur, diastolic murmur, rubs, gallop





- GI/Abdominal


GI/Abdominal exam: Present: soft, normal bowel sounds.  Absent: tenderness, 

guarding, rebound, hyperactive bowel sounds, hypoactive bowel sounds





- Extremities Exam


Extremities exam: Present: normal inspection, full ROM, normal capillary refill





- Back Exam


Back exam: Present: normal inspection, full ROM.  Absent: tenderness, CVA 

tenderness (R), CVA tenderness (L), muscle spasm, paraspinal tenderness





- Neurological Exam


Neurological exam: Present: alert, oriented X3, CN II-XII intact, normal gait, 

reflexes normal





- Psychiatric


Psychiatric exam: Present: normal affect, normal mood, anxious, flat affect.  

Absent: depressed





- Skin


Skin exam: Present: warm, dry, intact, normal color.  Absent: rash





ED Course





                                   Vital Signs











  08/11/20





  02:24


 


Temperature 98.9 F


 


Pulse Rate 94 H


 


Respiratory 18





Rate 


 


Blood Pressure 126/83


 


O2 Sat by Pulse 96





Oximetry 














ED Medical Decision Making





- Lab Data


Result diagrams: 


                                 08/11/20 03:43





                                 08/11/20 03:43





- Medical Decision Making





This is a 44-year-old white male with a history of bipolar disorder, 

schizophrenia and depression as well as GERD who presents to the ED for medical 

clearance in order to be admitted to psychiatric facility in order to straighten

 up his mental health medications.  Patient states that he has been taking 

medications Seroquel 400 mg every night but that this medicine makes him sleep 

throughout the day as well.  Patient states that he would like medical clearance

 so that he can get admission to any of the psychiatric facilities for further 

evaluation.  In the ED, patient is alert and oriented x3 and is not in distress.

  Lab test results were reviewed and are all nonactionable, patient is therefore

 medically cleared for evaluation at mental health facility of his choice.  

Patient was discharged from the ED and advised to follow-up with any of the 

health facility that he desires since he is medically cleared.  At the time of 

discharge from the ED patient is alert and oriented x3 and is not in distress, 

not suicidal, not homicidal, or having any hallucinations.





- Differential Diagnosis


bipolar, schizophrenia; drug abuse; alcohol buse





ED Disposition


Clinical Impression: 


 General medical exam, Medical clearance for psychiatric admission





Disposition: DC-01 TO HOME OR SELFCARE


Is pt being admited?: No


Does the pt Need Aspirin: No


Condition: Stable


Instructions:  Medical Clearance for Psychiatric Care (ED)


Additional Instructions: 


All lab test results were reviewed and are all nonactionable.  You are therefore

 medically cleared for admission to any psychiatric facility for further 

evaluation.


Referrals: 


[facility] [Outside] - 3-5 Days


Time of Disposition: 06:27


Print Language: ENGLISH

## 2020-10-20 NOTE — EMERGENCY DEPARTMENT REPORT
ED General Adult HPI





- General


Chief complaint: Psych


Stated complaint: MENTAL HEALTH


Time Seen by Provider: 10/20/20 13:20


Source: patient


Mode of arrival: Ambulatory


Limitations: No Limitations





- History of Present Illness


Initial comments: 





Patient presents to the emergency department chief complaint of suicidal 

thoughts.  Patient states this plan will be to overdose on pills.  Patient 

states he is try to harm himself in the past.  Patient states he is down and out

and feels depressed.  Patient has made a suicide note.


-: unknown


Severity scale (0 -10): 0


Consistency: constant


Improves with: none


Worsens with: none


Associated Symptoms: denies other symptoms


Treatments Prior to Arrival: none





- Related Data


                                Home Medications











 Medication  Instructions  Recorded  Confirmed  Last Taken


 


Omeprazole Magnesium [PriLOSEC Otc] 25 mg PO QDAY 07/22/20 10/20/20 Unknown


 


Quetiapine Fumarate [SEROquel] 400 mg PO QHS 07/22/20 10/20/20 Unknown











                                    Allergies











Allergy/AdvReac Type Severity Reaction Status Date / Time


 


ziprasidone [From Geodon] Allergy  Anaphylaxis Verified 05/01/20 23:41














ED Review of Systems


ROS: 


Stated complaint: MENTAL HEALTH


Other details as noted in HPI





Comment: All other systems reviewed and negative


Constitutional: denies: chills, fever


Eyes: denies: eye pain, eye discharge, vision change


ENT: denies: ear pain, throat pain


Respiratory: denies: cough, shortness of breath, wheezing


Cardiovascular: denies: chest pain, palpitations


Endocrine: no symptoms reported


Gastrointestinal: denies: abdominal pain, nausea, diarrhea


Genitourinary: denies: urgency, dysuria


Musculoskeletal: denies: back pain, joint swelling, arthralgia


Skin: denies: rash, lesions


Neurological: denies: headache, weakness, paresthesias


Psychiatric: suicidal thoughts.  denies: anxiety, depression


Hematological/Lymphatic: denies: easy bleeding, easy bruising





ED Past Medical Hx





- Past Medical History


Hx GERD: Yes


Hx Psychiatric Treatment: Yes (Bipolar schizophrenia; Depression)


Additional medical history: Heart murmur





- Surgical History


Additional Surgical History: Right ankle, pilonidal cysts





- Social History


Smoking Status: Current Every Day Smoker





- Medications


Home Medications: 


                                Home Medications











 Medication  Instructions  Recorded  Confirmed  Last Taken  Type


 


Omeprazole Magnesium [PriLOSEC Otc] 25 mg PO QDAY 07/22/20 10/20/20 Unknown 

History


 


Quetiapine Fumarate [SEROquel] 400 mg PO QHS 07/22/20 10/20/20 Unknown History














ED Physical Exam





- General


Limitations: No Limitations


General appearance: alert, in no apparent distress





- Head


Head exam: Present: atraumatic, normocephalic





- Eye


Eye exam: Present: normal appearance





- ENT


ENT exam: Present: mucous membranes moist





- Neck


Neck exam: Present: normal inspection





- Respiratory


Respiratory exam: Present: normal lung sounds bilaterally.  Absent: respiratory 

distress





- Cardiovascular


Cardiovascular Exam: Present: regular rate, normal rhythm.  Absent: systolic 

murmur, diastolic murmur, rubs, gallop





- GI/Abdominal


GI/Abdominal exam: Present: soft, normal bowel sounds.  Absent: distended, 

tenderness





- Rectal


Rectal exam: Present: deferred





- Extremities Exam


Extremities exam: Present: normal inspection





- Back Exam


Back exam: Present: normal inspection





- Neurological Exam


Neurological exam: Present: alert, oriented X3, CN II-XII intact.  Absent: motor

 sensory deficit





- Psychiatric


Psychiatric exam: Present: normal affect, normal mood





- Skin


Skin exam: Present: warm, dry, intact, normal color.  Absent: rash





ED Course





                                   Vital Signs











  10/20/20





  13:20


 


Temperature 98.4 F


 


Pulse Rate 100 H


 


Respiratory 18





Rate 


 


Blood Pressure 131/80


 


O2 Sat by Pulse 94





Oximetry 














ED Medical Decision Making





- Lab Data


Result diagrams: 


                                 10/20/20 14:18





                                 10/20/20 14:18








                                   Lab Results











  10/20/20 10/20/20 10/20/20 Range/Units





  14:07 14:07 14:18 


 


WBC    8.5  (4.5-11.0)  K/mm3


 


RBC    5.83 H  (3.65-5.03)  M/mm3


 


Hgb    17.8 H  (11.8-15.2)  gm/dl


 


Hct    52.0 H  (35.5-45.6)  %


 


MCV    89  (84-94)  fl


 


MCH    31  (28-32)  pg


 


MCHC    34  (32-34)  %


 


RDW    13.9  (13.2-15.2)  %


 


Plt Count    236  (140-440)  K/mm3


 


Lymph % (Auto)    27.5  (13.4-35.0)  %


 


Mono % (Auto)    8.9 H  (0.0-7.3)  %


 


Eos % (Auto)    3.3  (0.0-4.3)  %


 


Baso % (Auto)    2.5 H  (0.0-1.8)  %


 


Lymph # (Auto)    2.3  (1.2-5.4)  K/mm3


 


Mono # (Auto)    0.8  (0.0-0.8)  K/mm3


 


Eos # (Auto)    0.3  (0.0-0.4)  K/mm3


 


Baso # (Auto)    0.2 H  (0.0-0.1)  K/mm3


 


Seg Neutrophils %    57.8  (40.0-70.0)  %


 


Seg Neutrophils #    4.9  (1.8-7.7)  K/mm3


 


Sodium     (137-145)  mmol/L


 


Potassium     (3.6-5.0)  mmol/L


 


Chloride     ()  mmol/L


 


Carbon Dioxide     (22-30)  mmol/L


 


Anion Gap     mmol/L


 


BUN     (9-20)  mg/dL


 


Creatinine     (0.8-1.3)  mg/dL


 


Estimated GFR     ml/min


 


BUN/Creatinine Ratio     %


 


Glucose     ()  mg/dL


 


Calcium     (8.4-10.2)  mg/dL


 


Total Bilirubin     (0.1-1.2)  mg/dL


 


AST     (5-40)  units/L


 


ALT     (7-56)  units/L


 


Alkaline Phosphatase     ()  units/L


 


Total Protein     (6.3-8.2)  g/dL


 


Albumin     (3.9-5)  g/dL


 


Albumin/Globulin Ratio     %


 


Urine Color  Yellow    (Yellow)  


 


Urine Turbidity  Clear    (Clear)  


 


Urine pH  6.0    (5.0-7.0)  


 


Ur Specific Gravity  1.015    (1.003-1.030)  


 


Urine Protein  <15 mg/dl    (Negative)  mg/dL


 


Urine Glucose (UA)  Neg    (Negative)  mg/dL


 


Urine Ketones  Neg    (Negative)  mg/dL


 


Urine Blood  Neg    (Negative)  


 


Urine Nitrite  Neg    (Negative)  


 


Urine Bilirubin  Neg    (Negative)  


 


Urine Urobilinogen  < 2.0    (<2.0)  mg/dL


 


Ur Leukocyte Esterase  Neg    (Negative)  


 


Urine WBC (Auto)  < 1.0    (0.0-6.0)  /HPF


 


Urine RBC (Auto)  1.0    (0.0-6.0)  /HPF


 


Urine Mucus  Few    /HPF


 


Salicylates     (2.8-20.0)  mg/dL


 


Urine Opiates Screen   Presumptive negative   


 


Urine Methadone Screen   Presumptive negative   


 


Acetaminophen     (10.0-30.0)  ug/mL


 


Ur Barbiturates Screen   Presumptive negative   


 


Ur Phencyclidine Scrn   Presumptive negative   


 


Ur Amphetamines Screen   Presumptive negative   


 


U Benzodiazepines Scrn   Presumptive negative   


 


Urine Cocaine Screen   Presumptive negative   


 


U Marijuana (THC) Screen   Presumptive negative   


 


Drugs of Abuse Note   Disclamer   














  10/20/20 10/20/20 10/20/20 Range/Units





  14:18 14:18 14:18 


 


WBC     (4.5-11.0)  K/mm3


 


RBC     (3.65-5.03)  M/mm3


 


Hgb     (11.8-15.2)  gm/dl


 


Hct     (35.5-45.6)  %


 


MCV     (84-94)  fl


 


MCH     (28-32)  pg


 


MCHC     (32-34)  %


 


RDW     (13.2-15.2)  %


 


Plt Count     (140-440)  K/mm3


 


Lymph % (Auto)     (13.4-35.0)  %


 


Mono % (Auto)     (0.0-7.3)  %


 


Eos % (Auto)     (0.0-4.3)  %


 


Baso % (Auto)     (0.0-1.8)  %


 


Lymph # (Auto)     (1.2-5.4)  K/mm3


 


Mono # (Auto)     (0.0-0.8)  K/mm3


 


Eos # (Auto)     (0.0-0.4)  K/mm3


 


Baso # (Auto)     (0.0-0.1)  K/mm3


 


Seg Neutrophils %     (40.0-70.0)  %


 


Seg Neutrophils #     (1.8-7.7)  K/mm3


 


Sodium  139    (137-145)  mmol/L


 


Potassium  4.5    (3.6-5.0)  mmol/L


 


Chloride  102.6    ()  mmol/L


 


Carbon Dioxide  22    (22-30)  mmol/L


 


Anion Gap  19    mmol/L


 


BUN  16    (9-20)  mg/dL


 


Creatinine  0.7 L    (0.8-1.3)  mg/dL


 


Estimated GFR  > 60    ml/min


 


BUN/Creatinine Ratio  23    %


 


Glucose  80    ()  mg/dL


 


Calcium  9.2    (8.4-10.2)  mg/dL


 


Total Bilirubin  0.20    (0.1-1.2)  mg/dL


 


AST  19    (5-40)  units/L


 


ALT  34    (7-56)  units/L


 


Alkaline Phosphatase  69    ()  units/L


 


Total Protein  6.9    (6.3-8.2)  g/dL


 


Albumin  4.6    (3.9-5)  g/dL


 


Albumin/Globulin Ratio  2.0    %


 


Urine Color     (Yellow)  


 


Urine Turbidity     (Clear)  


 


Urine pH     (5.0-7.0)  


 


Ur Specific Gravity     (1.003-1.030)  


 


Urine Protein     (Negative)  mg/dL


 


Urine Glucose (UA)     (Negative)  mg/dL


 


Urine Ketones     (Negative)  mg/dL


 


Urine Blood     (Negative)  


 


Urine Nitrite     (Negative)  


 


Urine Bilirubin     (Negative)  


 


Urine Urobilinogen     (<2.0)  mg/dL


 


Ur Leukocyte Esterase     (Negative)  


 


Urine WBC (Auto)     (0.0-6.0)  /HPF


 


Urine RBC (Auto)     (0.0-6.0)  /HPF


 


Urine Mucus     /HPF


 


Salicylates   < 0.3 L   (2.8-20.0)  mg/dL


 


Urine Opiates Screen     


 


Urine Methadone Screen     


 


Acetaminophen    5.0 L  (10.0-30.0)  ug/mL


 


Ur Barbiturates Screen     


 


Ur Phencyclidine Scrn     


 


Ur Amphetamines Screen     


 


U Benzodiazepines Scrn     


 


Urine Cocaine Screen     


 


U Marijuana (THC) Screen     


 


Drugs of Abuse Note     














- Medical Decision Making





Medically cleared


1013 applied


Awaiting mental health evaluation and placement


Critical care attestation.: 


If time is entered above; I have spent that time in minutes in the direct care 

of this critically ill patient, excluding procedure time.








ED Disposition


Clinical Impression: 


 Suicidal ideation





Disposition: DC/TX-65 PSY HOSP/PSY UNIT


Is pt being admited?: No


Does the pt Need Aspirin: No


Condition: Stable


Referrals: 


ANDRÉS SANDERS MD [Primary Care Provider] - 3-5 Days

## 2020-10-20 NOTE — EVENT NOTE
ED Screening Note


Date of service: 10/20/20


Time: 13:20


ED Screening Note: 





c/o suicidal thoughts with plan to overdose x today





This initial assessment/diagnostic orders/clinical plan/treatment(s) is/are 

subject to change based on patients health status, clinical progression and re-

assessment by fellow clinical providers in the ED. Further treatment and workup 

at subsequent clinical providers discretion. Patient/guardian urged not to elope

from the ED as their condition may be serious if not clinically assessed and 

managed. 





Initial orders include: 


labs


mental health eval


1013

## 2020-10-21 NOTE — CONSULTATION
History of Present Illness





- Reason for Consult


Consult date: 10/21/20


Reason for consult: MHE


Requesting physician: KYLIE BUTT





- History of Present Psychiatric Illness


Per ED Provider: Patient presents to the emergency department chief complaint of

suicidal thoughts.  Patient states this plan will be to overdose on pills.  

Patient states he is try to harm himself in the past.  Patient states he is down

and out and feels depressed.  Patient has made a suicide note.





Per MHA: Pt is a 46 y/o  male who presents to the ED for a MHE. During 

current ax, pt. presents with depressed mood, flat affect, and cooperative 

behaviors. Reports SI w/ plan to overdose on medications. Reports that he does 

not feel safe at home. Identifies stressors as surroundings and 

disagreements, negativity, or saying something with other residents. 





Reports hx of Schizophrenia, Bipolar Disorder, and Depression. Reports hx of a 

Suicide attempt in July via overdose and received psychiatric treatment from 

[facility]. Most recent hospitalization occurred 9/2020. Hx of self-harming 

behaviors to include butting head on cement, walls, wood, sheetrock.


Admits ot Auditory hallucination. Unable to explain content but denies command 

in nature. Denies Visual hallucinations. Denies alcohol or illicit drug use.  Hx

of anxiety no panic attacks reported. Reports poor appetite and insomnia. 

Noncompliant with medications. No outpatient provider at this time.  Arrest hx 

of terroristic threat against previous [facility].





PSYCH HPI


Patient is a 45-year-old single, currently resides in an independent living 

facility, unemployed currently on SSI with past psychiatric history of mild 

cognitive impairment, depression, and bipolar and no significant past medical 

history who presented to the ED yesterday with chief complaint of suicidal tho

ughts with no plan.  Patient reports yesterday he was angry and was trying to 

understand circumstances in his life up to that very moment because some things 

in the past were beginning to get caught up with him, and that was only 

yesterday because now he feels better he was able to think through things while 

he has been here since yesterday.  Reports he has family and siblings in 

[facility] who are very caring and supportive even though he was not 

adopted child has a sister that he has so much about him and is very very close 

with


Patient denies suicidal ideation or any homicidal ideation at this moment also 

denies any auditory or visual hallucination


 





PAST PSYCHIATRIC HISTORY


Diagnoses: mild cognitive impairment, depression, and bipolar


Suicide attempts or Self-harm behavior: None reported


Prior psychiatric hospitalizations: Yes


Substance Abuse history: None reported


Previous psychiatric medications tried: Yes and compliant


Outpatient treatment: Yes and compliant





PAST MEDICAL HISTORY:





Family Psychiatric History: None reported or documented





SOCIAL HISTORY


Marital Status: Single


Living Arrangements: Independent living facility


Employment Status: On SSI


Access to guns/weapons: None reported


Education: Seventh grade


History of Abuse: None reported


Legal History: None reported





REVIEW OF SYSTEMS


Constitutional: Negative for weight loss


ENT: Negative for stridor


Respiratory: Negative for cough or hemoptysis


All other systems reviewed and are negative


 


MENTAL STATUS EXAMINATION


General Appearance and Behavior: Age appropriate, good hygiene, wearing 

appropriate clothes, good eye contact, cooperative polite with questioning.


Cooperation: Participating/engaged


Psychomotor Behavior:  unremarkable and within normal limits


Mood: Good


Affect and affective range: congruent with mood


Thought Process: Fluent/Logical, 


Thought Content: Within reality,


Speech: Normal volume, Regular rate and rhythm, 


Intellectual Functioning: Average


Suicidal Ideation: Denies SI


Homicidal Ideation: Denies HI


Impulse Control: Unimpaired


Insight and Judgment: Normal insight and judgment,


Memory: Normal, 


Attention: Normal,


Orientation: Alert, oriented, 





Diagnoses:





 Assessment and Plan 





- Psychiatric problem


(1) Hx of major depression


Current Visit: Yes   Status: Acute   





Treatment Plan





MEDICATIONS: 


Risks, benefits and alternatives of medications discussed with the patient, 

questions answered and consent obtained from patient.


PSYCHOTHERAPY: Supportive psychotherapy provided


MEDICAL: Per primary team


DELIRIUM PRECAUTIONS: Please re-orient patient frequently, keep lights on during

the day, and minimize benzodiazepines and opiates as these medications could 

worsen patient's confusion.


SAFETY SITTER:


DISPOSITION:  Do not Recommend acute inpatient psychiatric hospitalization at 

this time


LEGAL STATUS:  1013 rescinded


FOLLOW-UP: Will sign off


Thank you for the consult.  Please contact with any questions and/or concerns.


   





Medications and Allergies


                                    Allergies











Allergy/AdvReac Type Severity Reaction Status Date / Time


 


ziprasidone [From Geodon] Allergy  Anaphylaxis Verified 05/01/20 23:41











                                Home Medications











 Medication  Instructions  Recorded  Confirmed  Last Taken  Type


 


Omeprazole Magnesium [PriLOSEC Otc] 25 mg PO QDAY 07/22/20 10/20/20 Unknown 

History


 


Quetiapine Fumarate [SEROquel] 400 mg PO QHS 07/22/20 10/20/20 Unknown History














Mental Status Exam





- Vital signs


                                Last Vital Signs











Temp  98.4 F   10/21/20 09:00


 


Pulse  74   10/21/20 09:00


 


Resp  18   10/21/20 09:00


 


BP  124/82   10/21/20 09:00


 


Pulse Ox  96   10/21/20 09:00














Results


Result Diagrams: 


                                 10/20/20 14:18





                                 10/20/20 14:18


                              Abnormal lab results











  10/20/20 10/20/20 10/20/20 Range/Units





  14:18 14:18 14:18 


 


RBC  5.83 H    (3.65-5.03)  M/mm3


 


Hgb  17.8 H    (11.8-15.2)  gm/dl


 


Hct  52.0 H    (35.5-45.6)  %


 


Mono % (Auto)  8.9 H    (0.0-7.3)  %


 


Baso % (Auto)  2.5 H    (0.0-1.8)  %


 


Baso # (Auto)  0.2 H    (0.0-0.1)  K/mm3


 


Creatinine   0.7 L   (0.8-1.3)  mg/dL


 


Salicylates    < 0.3 L  (2.8-20.0)  mg/dL


 


Acetaminophen     (10.0-30.0)  ug/mL














  10/20/20 Range/Units





  14:18 


 


RBC   (3.65-5.03)  M/mm3


 


Hgb   (11.8-15.2)  gm/dl


 


Hct   (35.5-45.6)  %


 


Mono % (Auto)   (0.0-7.3)  %


 


Baso % (Auto)   (0.0-1.8)  %


 


Baso # (Auto)   (0.0-0.1)  K/mm3


 


Creatinine   (0.8-1.3)  mg/dL


 


Salicylates   (2.8-20.0)  mg/dL


 


Acetaminophen  5.0 L  (10.0-30.0)  ug/mL








All other labs normal.








Assessment and Plan





- Psychiatric problem


(1) Hx of major depression


Current Visit: Yes   Status: Acute

## 2021-06-07 NOTE — EMERGENCY DEPARTMENT REPORT
ED Medical Clearance HPI





- General


Chief complaint: Medical Clearance


Stated complaint: AMS


Time Seen by Provider: 06/07/21 01:01


Source: patient


Mode of arrival: Ambulatory





- History of Present Illness


Initial comments: 





This is a 45-year-old male nontoxic, well nourished in appearance, no acute 

signs of distress presents to the ED with c/o for medical clearance for psych 

facility.  Patient stated has a appointment scheduled with [facility] for outpatient

psych treatment facility for medication change and refill.  Patient stated has 

been not taking his medication for the past few months and is scheduled for 

appointment but stated needs medical clearance prior.  Patient otherwise denies 

any complaints or symptoms.  Denies any visual or auditory hallucination.  

Denies any suicidal or homicidal ideation.  Patient denies any fever, chills, 

nausea, vomiting, chest pain, shortness of breath, headache or stiff neck.  

Patient otherwise denies any symptoms.  Denies any alcohol or drug consumption. 

Patient stated allergies to ziprasidone.


MD Complaint: medical clearance request


Alledged Intoxication: No


Compliant with Home Medications: No


Traumatic Symptoms: denies traumatic injury


Associated Symptoms: denies other symptoms.  denies: chest pain, shortness of 

breath, palpitations, diaphoresis, confusion, cough, fever/chills, headaches, 

anorexia, malaise, nausea/vomiting, rash, seizure, syncope, weakness


Treatments Prior to Arrival: none


Home medications: 


                                Home Medications











 Medication  Instructions  Recorded  Confirmed  Last Taken


 


Omeprazole Magnesium [PriLOSEC Otc] 25 mg PO QDAY 07/22/20 10/20/20 Unknown


 


Quetiapine Fumarate [SEROquel] 400 mg PO QHS 07/22/20 10/20/20 Unknown











Allergies/Adverse reactions: 


                                    Allergies











Allergy/AdvReac Type Severity Reaction Status Date / Time


 


ziprasidone [From [facility]] Allergy  Anaphylaxis Verified 05/01/20 23:41














ED Review of Systems


ROS: 


Stated complaint: AMS


Other details as noted in HPI





Comment: All other systems reviewed and negative


Constitutional: denies: chills, fever


Eyes: denies: eye pain, eye discharge, vision change


ENT: denies: ear pain, throat pain


Respiratory: denies: cough, shortness of breath, wheezing


Cardiovascular: denies: chest pain, palpitations


Endocrine: no symptoms reported


Gastrointestinal: denies: abdominal pain, nausea, diarrhea


Genitourinary: denies: urgency, dysuria


Musculoskeletal: denies: back pain, joint swelling, arthralgia


Skin: denies: rash, lesions


Neurological: denies: headache, weakness, paresthesias


Psychiatric: denies: anxiety, depression, auditory hallucinations, visual 

hallucinations, homicidal thoughts, suicidal thoughts


Hematological/Lymphatic: denies: easy bleeding, easy bruising





ED Past Medical Hx





- Past Medical History


Previous Medical History?: Yes


Hx GERD: Yes


Hx Psychiatric Treatment: Yes (Bipolar schizophrenia; Depression)


Additional medical history: Heart murmur





- Surgical History


Past Surgical History?: Yes


Additional Surgical History: Right ankle, pilonidal cysts





- Social History


Smoking Status: Never Smoker


Substance Use Type: None





- Medications


Home Medications: 


                                Home Medications











 Medication  Instructions  Recorded  Confirmed  Last Taken  Type


 


Omeprazole Magnesium [PriLOSEC Otc] 25 mg PO QDAY 07/22/20 10/20/20 Unknown 

History


 


Quetiapine Fumarate [SEROquel] 400 mg PO QHS 07/22/20 10/20/20 Unknown History














ED Physical Exam





- General


Limitations: No Limitations


General appearance: alert, in no apparent distress





- Head


Head exam: Present: atraumatic, normocephalic





- Eye


Eye exam: Present: normal appearance





- Neck


Neck exam: Present: normal inspection, full ROM.  Absent: tenderness, 

meningismus, lymphadenopathy





- Respiratory


Respiratory exam: Present: normal lung sounds bilaterally.  Absent: respiratory 

distress, wheezes, rales, rhonchi, stridor, chest wall tenderness, accessory 

muscle use, decreased breath sounds, prolonged expiratory





- Cardiovascular


Cardiovascular Exam: Present: regular rate, normal rhythm, normal heart sounds. 

 Absent: irregular rhythm, systolic murmur, diastolic murmur, rubs, gallop





- GI/Abdominal


GI/Abdominal exam: Present: soft, normal bowel sounds.  Absent: distended, 

tenderness, guarding, rebound, rigid, diminished bowel sounds





- Extremities Exam


Extremities exam: Present: normal inspection, full ROM, normal capillary refill.

  Absent: tenderness





- Back Exam


Back exam: Present: normal inspection, full ROM.  Absent: tenderness, CVA 

tenderness (R), CVA tenderness (L), muscle spasm, paraspinal tenderness, 

vertebral tenderness, rash noted





- Neurological Exam


Neurological exam: Present: alert, oriented X3, normal gait





- Psychiatric


Psychiatric exam: Present: normal affect, normal mood





- Skin


Skin exam: Present: warm, dry, intact, normal color.  Absent: rash





ED Course





                                   Vital Signs











  06/06/21





  23:57


 


Temperature 98.9 F


 


Pulse Rate 104 H


 


Respiratory 16





Rate 


 


Blood Pressure 140/85





[Right] 


 


O2 Sat by Pulse 96





Oximetry 














- Reevaluation(s)


Reevaluation #1: 





06/07/21 03:16


Patient is speaking in full sentences with no signs of distress noted.





ED Medical Decision Making





- Lab Data


Result diagrams: 


                                 06/07/21 01:58





                                 06/07/21 01:58








                                   Lab Results











  06/07/21 06/07/21 06/07/21 Range/Units





  01:58 01:58 01:58 


 


WBC  14.7 H    (4.5-11.0)  K/mm3


 


RBC  5.98 H    (3.65-5.03)  M/mm3


 


Hgb  19.0 H    (11.8-15.2)  gm/dl


 


Hct  54.7 H    (35.5-45.6)  %


 


MCV  92    (84-94)  fl


 


MCH  32    (28-32)  pg


 


MCHC  35 H    (32-34)  %


 


RDW  13.5    (13.2-15.2)  %


 


Plt Count  220    (140-440)  K/mm3


 


Lymph % (Auto)  26.2    (13.4-35.0)  %


 


Mono % (Auto)  8.8 H    (0.0-7.3)  %


 


Eos % (Auto)  2.6    (0.0-4.3)  %


 


Baso % (Auto)  1.5    (0.0-1.8)  %


 


Lymph # (Auto)  3.9    (1.2-5.4)  K/mm3


 


Mono # (Auto)  1.3 H    (0.0-0.8)  K/mm3


 


Eos # (Auto)  0.4    (0.0-0.4)  K/mm3


 


Baso # (Auto)  0.2 H    (0.0-0.1)  K/mm3


 


Seg Neutrophils %  60.9    (40.0-70.0)  %


 


Seg Neutrophils #  9.0 H    (1.8-7.7)  K/mm3


 


Sodium   139   (137-145)  mmol/L


 


Potassium   4.3   (3.6-5.0)  mmol/L


 


Chloride   100.6   ()  mmol/L


 


Carbon Dioxide   25   (22-30)  mmol/L


 


Anion Gap   18   mmol/L


 


BUN   23 H   (9-20)  mg/dL


 


Creatinine   0.6 L   (0.8-1.3)  mg/dL


 


Estimated GFR   > 60   ml/min


 


BUN/Creatinine Ratio   38   %


 


Glucose   79   ()  mg/dL


 


Calcium   9.5   (8.4-10.2)  mg/dL


 


Urine Color     (Yellow)  


 


Urine Turbidity     (Clear)  


 


Urine pH     (5.0-7.0)  


 


Ur Specific Gravity     (1.003-1.030)  


 


Urine Protein     (Negative)  mg/dL


 


Urine Glucose (UA)     (Negative)  mg/dL


 


Urine Ketones     (Negative)  mg/dL


 


Urine Blood     (Negative)  


 


Urine Nitrite     (Negative)  


 


Urine Bilirubin     (Negative)  


 


Urine Urobilinogen     (<2.0)  mg/dL


 


Ur Leukocyte Esterase     (Negative)  


 


Urine WBC (Auto)     (0.0-6.0)  /HPF


 


Urine RBC (Auto)     (0.0-6.0)  /HPF


 


Urine Mucus     /HPF


 


Salicylates    < 0.3 L  (2.8-20.0)  mg/dL


 


Urine Opiates Screen     


 


Urine Methadone Screen     


 


Acetaminophen     (10.0-30.0)  ug/mL


 


Ur Barbiturates Screen     


 


Ur Phencyclidine Scrn     


 


Ur Amphetamines Screen     


 


U Benzodiazepines Scrn     


 


Urine Cocaine Screen     


 


U Marijuana (THC) Screen     


 


Drugs of Abuse Note     


 


Plasma/Serum Alcohol     (0-0.07)  %














  06/07/21 06/07/21 06/07/21 Range/Units





  01:58 01:58 02:13 


 


WBC     (4.5-11.0)  K/mm3


 


RBC     (3.65-5.03)  M/mm3


 


Hgb     (11.8-15.2)  gm/dl


 


Hct     (35.5-45.6)  %


 


MCV     (84-94)  fl


 


MCH     (28-32)  pg


 


MCHC     (32-34)  %


 


RDW     (13.2-15.2)  %


 


Plt Count     (140-440)  K/mm3


 


Lymph % (Auto)     (13.4-35.0)  %


 


Mono % (Auto)     (0.0-7.3)  %


 


Eos % (Auto)     (0.0-4.3)  %


 


Baso % (Auto)     (0.0-1.8)  %


 


Lymph # (Auto)     (1.2-5.4)  K/mm3


 


Mono # (Auto)     (0.0-0.8)  K/mm3


 


Eos # (Auto)     (0.0-0.4)  K/mm3


 


Baso # (Auto)     (0.0-0.1)  K/mm3


 


Seg Neutrophils %     (40.0-70.0)  %


 


Seg Neutrophils #     (1.8-7.7)  K/mm3


 


Sodium     (137-145)  mmol/L


 


Potassium     (3.6-5.0)  mmol/L


 


Chloride     ()  mmol/L


 


Carbon Dioxide     (22-30)  mmol/L


 


Anion Gap     mmol/L


 


BUN     (9-20)  mg/dL


 


Creatinine     (0.8-1.3)  mg/dL


 


Estimated GFR     ml/min


 


BUN/Creatinine Ratio     %


 


Glucose     ()  mg/dL


 


Calcium     (8.4-10.2)  mg/dL


 


Urine Color    Yellow  (Yellow)  


 


Urine Turbidity    Clear  (Clear)  


 


Urine pH    5.0  (5.0-7.0)  


 


Ur Specific Gravity    1.031 H  (1.003-1.030)  


 


Urine Protein    <15 mg/dl  (Negative)  mg/dL


 


Urine Glucose (UA)    Neg  (Negative)  mg/dL


 


Urine Ketones    Neg  (Negative)  mg/dL


 


Urine Blood    Neg  (Negative)  


 


Urine Nitrite    Neg  (Negative)  


 


Urine Bilirubin    Neg  (Negative)  


 


Urine Urobilinogen    < 2.0  (<2.0)  mg/dL


 


Ur Leukocyte Esterase    Neg  (Negative)  


 


Urine WBC (Auto)    < 1.0  (0.0-6.0)  /HPF


 


Urine RBC (Auto)    < 1.0  (0.0-6.0)  /HPF


 


Urine Mucus    Few  /HPF


 


Salicylates     (2.8-20.0)  mg/dL


 


Urine Opiates Screen     


 


Urine Methadone Screen     


 


Acetaminophen  5.0 L    (10.0-30.0)  ug/mL


 


Ur Barbiturates Screen     


 


Ur Phencyclidine Scrn     


 


Ur Amphetamines Screen     


 


U Benzodiazepines Scrn     


 


Urine Cocaine Screen     


 


U Marijuana (THC) Screen     


 


Drugs of Abuse Note     


 


Plasma/Serum Alcohol   < 0.01   (0-0.07)  %














  06/07/21 Range/Units





  02:13 


 


WBC   (4.5-11.0)  K/mm3


 


RBC   (3.65-5.03)  M/mm3


 


Hgb   (11.8-15.2)  gm/dl


 


Hct   (35.5-45.6)  %


 


MCV   (84-94)  fl


 


MCH   (28-32)  pg


 


MCHC   (32-34)  %


 


RDW   (13.2-15.2)  %


 


Plt Count   (140-440)  K/mm3


 


Lymph % (Auto)   (13.4-35.0)  %


 


Mono % (Auto)   (0.0-7.3)  %


 


Eos % (Auto)   (0.0-4.3)  %


 


Baso % (Auto)   (0.0-1.8)  %


 


Lymph # (Auto)   (1.2-5.4)  K/mm3


 


Mono # (Auto)   (0.0-0.8)  K/mm3


 


Eos # (Auto)   (0.0-0.4)  K/mm3


 


Baso # (Auto)   (0.0-0.1)  K/mm3


 


Seg Neutrophils %   (40.0-70.0)  %


 


Seg Neutrophils #   (1.8-7.7)  K/mm3


 


Sodium   (137-145)  mmol/L


 


Potassium   (3.6-5.0)  mmol/L


 


Chloride   ()  mmol/L


 


Carbon Dioxide   (22-30)  mmol/L


 


Anion Gap   mmol/L


 


BUN   (9-20)  mg/dL


 


Creatinine   (0.8-1.3)  mg/dL


 


Estimated GFR   ml/min


 


BUN/Creatinine Ratio   %


 


Glucose   ()  mg/dL


 


Calcium   (8.4-10.2)  mg/dL


 


Urine Color   (Yellow)  


 


Urine Turbidity   (Clear)  


 


Urine pH   (5.0-7.0)  


 


Ur Specific Gravity   (1.003-1.030)  


 


Urine Protein   (Negative)  mg/dL


 


Urine Glucose (UA)   (Negative)  mg/dL


 


Urine Ketones   (Negative)  mg/dL


 


Urine Blood   (Negative)  


 


Urine Nitrite   (Negative)  


 


Urine Bilirubin   (Negative)  


 


Urine Urobilinogen   (<2.0)  mg/dL


 


Ur Leukocyte Esterase   (Negative)  


 


Urine WBC (Auto)   (0.0-6.0)  /HPF


 


Urine RBC (Auto)   (0.0-6.0)  /HPF


 


Urine Mucus   /HPF


 


Salicylates   (2.8-20.0)  mg/dL


 


Urine Opiates Screen  Presumptive negative  


 


Urine Methadone Screen  Presumptive negative  


 


Acetaminophen   (10.0-30.0)  ug/mL


 


Ur Barbiturates Screen  Presumptive negative  


 


Ur Phencyclidine Scrn  Presumptive negative  


 


Ur Amphetamines Screen  Presumptive negative  


 


U Benzodiazepines Scrn  Presumptive negative  


 


Urine Cocaine Screen  Presumptive negative  


 


U Marijuana (THC) Screen  Presumptive negative  


 


Drugs of Abuse Note  Disclamer  


 


Plasma/Serum Alcohol   (0-0.07)  %














- Medical Decision Making





Patient is stable and was examined by me.  Vital signs are stable prior to 

discharge.  Patient is currently medically cleared for outpatient psych 

treatment facility.  Patient was instructed to follow-up with a primary care 

doctor in 3-5 days or if symptoms worsen and continue return to emergency room 

as soon as possible.  At time of discharge, the patient does not seem toxic or 

ill in appearance.  No acute signs of distress noted.  Patient agrees to 

discharge treatment plan of care.  No further questions noted by the patient.





ED Disposition


Clinical Impression: 


 Medical clearance for psychiatric admission





Disposition: DC-01 TO HOME OR SELFCARE


Is pt being admited?: No


Does the pt Need Aspirin: No


Condition: Stable


Additional Instructions: 


Follow-up with a primary care doctor in 3-5 days or if symptoms worsen and 

continue return to emergency room as soon as possible. 





You are medically cleared for psychiatric treatment facility.


Referrals: 


MORRIS MAZA MD [Primary Care Provider] - 3-5 Days


PHONG HARRIS MD [Staff Physician] - 3-5 Days


Time of Disposition: 03:17

## 2021-12-03 ENCOUNTER — HOSPITAL ENCOUNTER (EMERGENCY)
Dept: HOSPITAL 5 - ED | Age: 46
Discharge: LEFT BEFORE DISCHARGE | End: 2021-12-03
Payer: MEDICARE

## 2021-12-03 VITALS — SYSTOLIC BLOOD PRESSURE: 115 MMHG | DIASTOLIC BLOOD PRESSURE: 94 MMHG

## 2021-12-03 DIAGNOSIS — Z53.21: Primary | ICD-10-CM

## 2022-01-03 ENCOUNTER — HOSPITAL ENCOUNTER (EMERGENCY)
Dept: HOSPITAL 5 - ED | Age: 47
LOS: 1 days | Discharge: HOME | End: 2022-01-04
Payer: MEDICARE

## 2022-01-03 VITALS — SYSTOLIC BLOOD PRESSURE: 135 MMHG | DIASTOLIC BLOOD PRESSURE: 88 MMHG

## 2022-01-03 DIAGNOSIS — Z79.899: ICD-10-CM

## 2022-01-03 DIAGNOSIS — F31.9: ICD-10-CM

## 2022-01-03 DIAGNOSIS — R44.0: Primary | ICD-10-CM

## 2022-01-03 LAB
BASOPHILS # (AUTO): 0.2 K/MM3 (ref 0–0.1)
BASOPHILS NFR BLD AUTO: 2 % (ref 0–1.8)
BENZODIAZEPINES SCREEN,URINE: (no result)
BILIRUB UR QL STRIP: (no result)
BLOOD UR QL VISUAL: (no result)
BUN SERPL-MCNC: 8 MG/DL (ref 9–20)
BUN/CREAT SERPL: 13 %
CALCIUM SERPL-MCNC: 9.4 MG/DL (ref 8.4–10.2)
EOSINOPHIL # BLD AUTO: 0.2 K/MM3 (ref 0–0.4)
EOSINOPHIL NFR BLD AUTO: 2.4 % (ref 0–4.3)
HCT VFR BLD CALC: 56.4 % (ref 35.5–45.6)
HEMOLYSIS INDEX: 5
HGB BLD-MCNC: 18.8 GM/DL (ref 11.8–15.2)
LYMPHOCYTES # BLD AUTO: 2.4 K/MM3 (ref 1.2–5.4)
LYMPHOCYTES NFR BLD AUTO: 24.1 % (ref 13.4–35)
MCHC RBC AUTO-ENTMCNC: 33 % (ref 32–34)
MCV RBC AUTO: 90 FL (ref 84–94)
METHADONE SCREEN,URINE: (no result)
MONOCYTES # (AUTO): 0.7 K/MM3 (ref 0–0.8)
MONOCYTES % (AUTO): 7.7 % (ref 0–7.3)
OPIATE SCREEN,URINE: (no result)
PH UR STRIP: 7 [PH] (ref 5–7)
PLATELET # BLD: 258 K/MM3 (ref 140–440)
PROT UR STRIP-MCNC: (no result) MG/DL
RBC # BLD AUTO: 6.24 M/MM3 (ref 3.65–5.03)
RBC #/AREA URNS HPF: 1 /HPF (ref 0–6)
UROBILINOGEN UR-MCNC: < 2 MG/DL (ref ?–2)
WBC #/AREA URNS HPF: < 1 /HPF (ref 0–6)

## 2022-01-03 PROCEDURE — 80320 DRUG SCREEN QUANTALCOHOLS: CPT

## 2022-01-03 PROCEDURE — 80307 DRUG TEST PRSMV CHEM ANLYZR: CPT

## 2022-01-03 PROCEDURE — 36415 COLL VENOUS BLD VENIPUNCTURE: CPT

## 2022-01-03 PROCEDURE — 85025 COMPLETE CBC W/AUTO DIFF WBC: CPT

## 2022-01-03 PROCEDURE — 81001 URINALYSIS AUTO W/SCOPE: CPT

## 2022-01-03 PROCEDURE — 80048 BASIC METABOLIC PNL TOTAL CA: CPT

## 2022-01-03 PROCEDURE — G0480 DRUG TEST DEF 1-7 CLASSES: HCPCS

## 2022-01-03 PROCEDURE — 99284 EMERGENCY DEPT VISIT MOD MDM: CPT

## 2022-01-03 NOTE — EMERGENCY DEPARTMENT REPORT
ED Psych HPI





- General


Chief Complaint: Psych


Stated Complaint: HEARING VOICES


Time Seen by Provider: 22 22:15


Source: patient


Mode of arrival: Ambulatory





- History of Present Illness


Initial Comments: 


Patient is 46-year-old male with history of schizoaffective disorder.  Patient 

presented to the ER stating that he is hearing voices.  Patient stated that he 

does not understand what the voices saying.  He denied any suicidal or homicidal

ideation.  No visual hallucination too.





MD Complaint: altered mental status


-: days(s)


Associated Psychiatric Symptoms: auditory hallucinations


Quality: constant


Associated Symptoms: denies other symptoms


Treatments Prior to Arrival: none





- Related Data


                                Home Medications











 Medication  Instructions  Recorded  Confirmed  Last Taken


 


Omeprazole Magnesium [PriLOSEC Otc] 25 mg PO QDAY 07/22/20 10/20/20 Unknown


 


Quetiapine Fumarate [SEROquel] 400 mg PO QHS 07/22/20 10/20/20 Unknown











                                    Allergies











Allergy/AdvReac Type Severity Reaction Status Date / Time


 


ziprasidone [From [facility]] Allergy  Anaphylaxis Verified 22 21:48














ED Review of Systems


ROS: 


Stated complaint: HEARING VOICES


Other details as noted in HPI





Comment: All other systems reviewed and negative


Constitutional: denies: chills, fever


Respiratory: denies: cough, shortness of breath, SOB with exertion, SOB at rest


Cardiovascular: denies: chest pain, palpitations


Gastrointestinal: denies: abdominal pain, nausea, vomiting


Neurological: denies: headache, weakness


Psychiatric: auditory hallucinations.  denies: depression, visual hallucinat

ions, homicidal thoughts, suicidal thoughts





ED Past Medical Hx





- Past Medical History


Hx GERD: Yes


Hx Psychiatric Treatment: Yes (Bipolar schizophrenia; Depression)


Additional medical history: Heart murmur





- Surgical History


Additional Surgical History: Right ankle, pilonidal cysts





- Social History


Smoking Status: Never Smoker


Substance Use Type: None





- Medications


Home Medications: 


                                Home Medications











 Medication  Instructions  Recorded  Confirmed  Last Taken  Type


 


Omeprazole Magnesium [PriLOSEC Otc] 25 mg PO QDAY 07/22/20 10/20/20 Unknown 

History


 


Quetiapine Fumarate [SEROquel] 400 mg PO QHS 07/22/20 10/20/20 Unknown History














ED Physical Exam





- General


Limitations: No Limitations


General appearance: alert, in no apparent distress





- Head


Head exam: Present: atraumatic, normocephalic, normal inspection





- Eye


Eye exam: Present: normal appearance, PERRL





- ENT


ENT exam: Present: normal exam, normal orophraynx, mucous membranes moist





- Neck


Neck exam: Present: normal inspection, full ROM.  Absent: tenderness, 

meningismus





- Respiratory


Respiratory exam: Present: normal lung sounds bilaterally





- Cardiovascular


Cardiovascular Exam: Present: regular rate, normal rhythm, normal heart sounds





- GI/Abdominal


GI/Abdominal exam: Present: soft, normal bowel sounds.  Absent: distended, 

tenderness, guarding, rebound, rigid, organomegaly, mass, bruit, pulsatile mass,

 hernia





- Extremities Exam


Extremities exam: Present: normal inspection, full ROM, normal capillary refill.

  Absent: tenderness, pedal edema, joint swelling, calf tenderness





- Back Exam


Back exam: Present: normal inspection, full ROM.  Absent: CVA tenderness (R), 

CVA tenderness (L)





- Neurological Exam


Neurological exam: Present: alert, oriented X3, CN II-XII intact





- Psychiatric


Psychiatric exam: Absent: depressed, agitated, homicidal ideation, suicidal 

ideation





- Skin


Skin exam: Present: warm, intact, normal color





ED Course


                                   Vital Signs











  22





  21:47 06:04


 


Temperature 98.7 F 


 


Pulse Rate 83 


 


Respiratory 17 18





Rate  


 


Blood Pressure 135/88 


 


O2 Sat by Pulse 96 19 L





Oximetry  














ED Medical Decision Making





- Lab Data


Result diagrams: 


                                 22 22:34





                                 22 22:34


Critical care attestation.: 


If time is entered above; I have spent that time in minutes in the direct care 

of this critically ill patient, excluding procedure time.








ED Disposition


Clinical Impression: 


 Auditory hallucination





Disposition: 01 HOME / SELF CARE / HOMELESS


Is pt being admited?: No


Condition: Stable


Additional Instructions: 


Professional and Agency Contacts To help Resolve Crises()


[place] Crisis Line: 1-669.658.1191


Suicide Prevention Line: 1-115.415.8142


Crisis Text Line: Text START to 809458


Emergency: 911





Outpatient COMM[facility]ITY Behavioral Health Resources:





[facility]: [facility]


[address] [place], [place] [address] 


Phone: [phone]





[address] [address]


Phone: [phone]





[place]:


[facility] -


[address] [place] [address]


Phone: [phone]


Monday thru Friday - 8am - 5pm





[place]:


[facility]


Address: [address], [place] [address]


Phone: [phone]





[place]:


[facility]


Address: [address] [place] [address]


Monday thru Friday- 7am-2pm


Phone: [phone]





[facility]


Address: [address] [place] [address]


Monday thru Friday: 8:30AM-5PM


Phone: [phone]











OUTPATIENT MENTAL HEALTH RESOURCES





[facility],


[address] [place] [address]


[phone]





[facility] Karyn Ruby MD:


[address]


[place] [address]


[phone]





[facility]:


[address] [place], [place] [id number] [phone]





[place] [address]


 [phone]





[facility]:


[address]


[place] [address]


[phone]





[facility]:


 [address]


 [facility] [id number] [phone]





[facility]:


[address] [place] [address]


[phone]





Santhosh Pandey MD:


  [facility] [id number] [phone]





[facility]:


[address] [place], [place] [id number] [phone]





**[place] CRISIS AND ACCESS LINE: [phone]


Referrals: 


PRIMARY CARE,MD [Primary Care Provider] - 3-5 Days


VANGIE CHOI MD [Staff Physician] - 3-5 Days

## 2022-01-04 NOTE — EMERGENCY DEPARTMENT REPORT
Blank Doc





- Documentation


Documentation: 





Patient feels better this morning.  He believes that he just had problems with 

anxiety.  He is not suicidal homicidal.  Is not delusional.  There is no 

evidence of acute psychosis.  Psychiatric services has completed the consult.  

They agree and do not recommend admission.  He was subsequently discharged with 

outpatient referral and resources.

## 2022-01-04 NOTE — CONSULTATION
History of Present Illness





- Reason for Consult


Consult date: 01/04/22


Reason for consult: anxiety





- History of Present Psychiatric Illness


The patient was seen today. He is a/o x 3. He is calm and cooperative. He is 

polite. The patient states he came in because he was anxious because he just 

moved to a new independent living. He says being a new place just made him n

ervous. The patient denies any SI/HI. He says "I'm not depressed. I'm hopeful 

about everything. I just got a little nervous." He says "I'm much better now." 

He denies hallucinations of any kind. He says "when I came in, but no not now. I

was just so nervous."





PAST PSYCHIATRIC HISTORY


Diagnoses: mild cognitive impairment, depression, schizoaffective, and bipolar


Suicide attempts or Self-harm behavior: None reported


Prior psychiatric hospitalizations: Yes


Substance Abuse history: None reported


Previous psychiatric medications tried: Yes


Outpatient treatment: Yes 





PAST MEDICAL HISTORY:





Family Psychiatric History: None reported or documented





SOCIAL HISTORY


Marital Status: Single


Living Arrangements: Independent living facility


Employment Status: On [employer]


Access to guns/weapons: None reported


Education: Seventh grade


History of Abuse: None reported


Legal History: None reported





REVIEW OF SYSTEMS


Constitutional: Negative for weight loss


ENT: Negative for stridor


Respiratory: Negative for cough or hemoptysis


All other systems reviewed and are negative


 


MENTAL STATUS EXAMINATION


General Appearance and Behavior: Age appropriate, good hygiene, wearing 

appropriate clothes, good eye contact, cooperative polite with questioning.


Cooperation: Participating/engaged


Psychomotor Behavior:  unremarkable and within normal limits


Mood: much better


Affect and affective range: congruent with mood


Thought Process: gaol directed


Thought Content: Within reality


Speech: Normal volume, Regular rate and rhythm


Suicidal Ideation: Denies SI


Homicidal Ideation: Denies HI


Impulse Control: Unimpaired


Insight and Judgment: Normal insight and judgment


Memory: Normal


Attention: Attentive


Orientation: Alert, oriented





 Assessment and Plan 


Generalized Anxiety Disorder





Treatment Plan


Continue previously prescribed meds


Risks, benefits and alternatives of medications discussed with the patient, 

questions answered and consent obtained from patient.


PSYCHOTHERAPY: Supportive psychotherapy provided


MEDICAL: Per primary team


DELIRIUM PRECAUTIONS: Please re-orient patient frequently, keep lights on during

the day, and minimize benzodiazepines and opiates as these medications could 

worsen patient's confusion.


SAFETY SITTER:


DISPOSITION:  Do not Recommend acute inpatient psychiatric hospitalization at 

this time


The patient to follow up with outpatient psych in 7 to 14 days upon discharge


Will sign off. Thank you for the consult.  Please contact with any questions 

and/or concerns.


Case staffed with Dr. Farley








Medications and Allergies


                                    Allergies











Allergy/AdvReac Type Severity Reaction Status Date / Time


 


ziprasidone [From Geodon] Allergy  Anaphylaxis Verified 01/03/22 21:48











                                Home Medications











 Medication  Instructions  Recorded  Confirmed  Last Taken  Type


 


Omeprazole Magnesium [PriLOSEC Otc] 25 mg PO QDAY 07/22/20 10/20/20 Unknown 

History


 


Quetiapine Fumarate [SEROquel] 400 mg PO QHS 07/22/20 10/20/20 Unknown History














Mental Status Exam





- Vital signs


                                Last Vital Signs











Temp  98.7 F   01/03/22 21:47


 


Pulse  83   01/03/22 21:47


 


Resp  18   01/04/22 06:04


 


BP  135/88   01/03/22 21:47


 


Pulse Ox  19 L  01/04/22 06:04














Results


Result Diagrams: 


                                 01/03/22 22:34





                                 01/03/22 22:34


                              Abnormal lab results











  01/03/22 01/03/22 01/03/22 Range/Units





  22:34 22:34 22:34 


 


RBC  6.24 H    (3.65-5.03)  M/mm3


 


Hgb  18.8 H    (11.8-15.2)  gm/dl


 


Hct  56.4 H    (35.5-45.6)  %


 


Mono % (Auto)  7.7 H    (0.0-7.3)  %


 


Baso % (Auto)  2.0 H    (0.0-1.8)  %


 


Baso # (Auto)  0.2 H    (0.0-0.1)  K/mm3


 


BUN   8 L   (9-20)  mg/dL


 


Creatinine   0.6 L   (0.8-1.3)  mg/dL


 


Salicylates    < 0.3 L  (2.8-20.0)  mg/dL


 


Acetaminophen     (10.0-30.0)  ug/mL














  01/03/22 Range/Units





  22:34 


 


RBC   (3.65-5.03)  M/mm3


 


Hgb   (11.8-15.2)  gm/dl


 


Hct   (35.5-45.6)  %


 


Mono % (Auto)   (0.0-7.3)  %


 


Baso % (Auto)   (0.0-1.8)  %


 


Baso # (Auto)   (0.0-0.1)  K/mm3


 


BUN   (9-20)  mg/dL


 


Creatinine   (0.8-1.3)  mg/dL


 


Salicylates   (2.8-20.0)  mg/dL


 


Acetaminophen  5.0 L  (10.0-30.0)  ug/mL








All other labs normal.